# Patient Record
Sex: MALE | Race: WHITE | NOT HISPANIC OR LATINO | Employment: FULL TIME | URBAN - METROPOLITAN AREA
[De-identification: names, ages, dates, MRNs, and addresses within clinical notes are randomized per-mention and may not be internally consistent; named-entity substitution may affect disease eponyms.]

---

## 2017-01-10 ENCOUNTER — APPOINTMENT (OUTPATIENT)
Dept: GENERAL RADIOLOGY | Facility: HOSPITAL | Age: 37
End: 2017-01-10

## 2017-01-10 ENCOUNTER — HOSPITAL ENCOUNTER (OUTPATIENT)
Facility: HOSPITAL | Age: 37
Setting detail: OBSERVATION
Discharge: HOME | End: 2017-01-11
Attending: EMERGENCY MEDICINE | Admitting: SURGERY

## 2017-01-10 ENCOUNTER — ANESTHESIA EVENT (OUTPATIENT)
Dept: PERIOP | Facility: HOSPITAL | Age: 37
End: 2017-01-10

## 2017-01-10 ENCOUNTER — ANESTHESIA (OUTPATIENT)
Dept: PERIOP | Facility: HOSPITAL | Age: 37
End: 2017-01-10

## 2017-01-10 ENCOUNTER — APPOINTMENT (OUTPATIENT)
Dept: CT IMAGING | Facility: HOSPITAL | Age: 37
End: 2017-01-10

## 2017-01-10 DIAGNOSIS — D72.829 LEUKOCYTOSIS, UNSPECIFIED TYPE: ICD-10-CM

## 2017-01-10 DIAGNOSIS — K35.890 OTHER ACUTE APPENDICITIS: Primary | ICD-10-CM

## 2017-01-10 DIAGNOSIS — R11.2 NON-INTRACTABLE VOMITING WITH NAUSEA, UNSPECIFIED VOMITING TYPE: ICD-10-CM

## 2017-01-10 DIAGNOSIS — K35.80 ACUTE APPENDICITIS: ICD-10-CM

## 2017-01-10 DIAGNOSIS — K35.30 ACUTE APPENDICITIS WITH LOCALIZED PERITONITIS: ICD-10-CM

## 2017-01-10 LAB
ALBUMIN SERPL-MCNC: 5.1 G/DL (ref 3.2–4.8)
ALBUMIN/GLOB SERPL: 1.3 G/DL (ref 1.5–2.5)
ALP SERPL-CCNC: 105 U/L (ref 25–100)
ALT SERPL W P-5'-P-CCNC: 35 U/L (ref 7–40)
ANION GAP SERPL CALCULATED.3IONS-SCNC: 14 MMOL/L (ref 3–11)
AST SERPL-CCNC: 53 U/L (ref 0–33)
BACTERIA UR QL AUTO: ABNORMAL /HPF
BASOPHILS # BLD AUTO: 0.02 10*3/MM3 (ref 0–0.2)
BASOPHILS NFR BLD AUTO: 0.1 % (ref 0–1)
BILIRUB SERPL-MCNC: 1.1 MG/DL (ref 0.3–1.2)
BILIRUB UR QL STRIP: NEGATIVE
BUN BLD-MCNC: 17 MG/DL (ref 9–23)
BUN/CREAT SERPL: 21.3 (ref 7–25)
CALCIUM SPEC-SCNC: 10.3 MG/DL (ref 8.7–10.4)
CHLORIDE SERPL-SCNC: 100 MMOL/L (ref 99–109)
CLARITY UR: CLEAR
CO2 SERPL-SCNC: 27 MMOL/L (ref 20–31)
COLOR UR: YELLOW
CREAT BLD-MCNC: 0.8 MG/DL (ref 0.6–1.3)
DEPRECATED RDW RBC AUTO: 38.9 FL (ref 37–54)
EOSINOPHIL # BLD AUTO: 0.16 10*3/MM3 (ref 0.1–0.3)
EOSINOPHIL NFR BLD AUTO: 0.7 % (ref 0–3)
ERYTHROCYTE [DISTWIDTH] IN BLOOD BY AUTOMATED COUNT: 12.7 % (ref 11.3–14.5)
FLUAV AG NPH QL: NEGATIVE
FLUBV AG NPH QL IA: NEGATIVE
GFR SERPL CREATININE-BSD FRML MDRD: 108 ML/MIN/1.73
GLOBULIN UR ELPH-MCNC: 3.8 GM/DL
GLUCOSE BLD-MCNC: 108 MG/DL (ref 70–100)
GLUCOSE UR STRIP-MCNC: NEGATIVE MG/DL
HCT VFR BLD AUTO: 48.6 % (ref 38.9–50.9)
HGB BLD-MCNC: 17.7 G/DL (ref 13.1–17.5)
HGB UR QL STRIP.AUTO: ABNORMAL
HOLD SPECIMEN: NORMAL
HYALINE CASTS UR QL AUTO: ABNORMAL /LPF
IMM GRANULOCYTES # BLD: 0.07 10*3/MM3 (ref 0–0.03)
IMM GRANULOCYTES NFR BLD: 0.3 % (ref 0–0.6)
KETONES UR QL STRIP: NEGATIVE
LEUKOCYTE ESTERASE UR QL STRIP.AUTO: NEGATIVE
LIPASE SERPL-CCNC: 37 U/L (ref 6–51)
LYMPHOCYTES # BLD AUTO: 0.84 10*3/MM3 (ref 0.6–4.8)
LYMPHOCYTES NFR BLD AUTO: 3.6 % (ref 24–44)
MCH RBC QN AUTO: 30.9 PG (ref 27–31)
MCHC RBC AUTO-ENTMCNC: 36.4 G/DL (ref 32–36)
MCV RBC AUTO: 85 FL (ref 80–99)
MONOCYTES # BLD AUTO: 0.5 10*3/MM3 (ref 0–1)
MONOCYTES NFR BLD AUTO: 2.2 % (ref 0–12)
NEUTROPHILS # BLD AUTO: 21.62 10*3/MM3 (ref 1.5–8.3)
NEUTROPHILS NFR BLD AUTO: 93.1 % (ref 41–71)
NITRITE UR QL STRIP: NEGATIVE
PH UR STRIP.AUTO: 5.5 [PH] (ref 5–8)
PLATELET # BLD AUTO: 297 10*3/MM3 (ref 150–450)
PMV BLD AUTO: 9.4 FL (ref 6–12)
POTASSIUM BLD-SCNC: 4.7 MMOL/L (ref 3.5–5.5)
PROT SERPL-MCNC: 8.9 G/DL (ref 5.7–8.2)
PROT UR QL STRIP: NEGATIVE
RBC # BLD AUTO: 5.72 10*6/MM3 (ref 4.2–5.76)
RBC # UR: ABNORMAL /HPF
REF LAB TEST METHOD: ABNORMAL
SODIUM BLD-SCNC: 141 MMOL/L (ref 132–146)
SP GR UR STRIP: 1.02 (ref 1–1.03)
SQUAMOUS #/AREA URNS HPF: ABNORMAL /HPF
UROBILINOGEN UR QL STRIP: ABNORMAL
WBC NRBC COR # BLD: 23.21 10*3/MM3 (ref 3.5–10.8)
WBC UR QL AUTO: ABNORMAL /HPF
WHOLE BLOOD HOLD SPECIMEN: NORMAL
WHOLE BLOOD HOLD SPECIMEN: NORMAL

## 2017-01-10 PROCEDURE — 96375 TX/PRO/DX INJ NEW DRUG ADDON: CPT

## 2017-01-10 PROCEDURE — 96376 TX/PRO/DX INJ SAME DRUG ADON: CPT

## 2017-01-10 PROCEDURE — 96361 HYDRATE IV INFUSION ADD-ON: CPT

## 2017-01-10 PROCEDURE — 80053 COMPREHEN METABOLIC PANEL: CPT | Performed by: EMERGENCY MEDICINE

## 2017-01-10 PROCEDURE — 87804 INFLUENZA ASSAY W/OPTIC: CPT | Performed by: PHYSICIAN ASSISTANT

## 2017-01-10 PROCEDURE — 25010000002 DEXAMETHASONE PER 1 MG: Performed by: ANESTHESIOLOGY

## 2017-01-10 PROCEDURE — 25010000002 PROPOFOL 10 MG/ML EMULSION: Performed by: ANESTHESIOLOGY

## 2017-01-10 PROCEDURE — 25010000002 ONDANSETRON PER 1 MG

## 2017-01-10 PROCEDURE — 74176 CT ABD & PELVIS W/O CONTRAST: CPT

## 2017-01-10 PROCEDURE — 25010000002 FENTANYL CITRATE (PF) 100 MCG/2ML SOLUTION: Performed by: ANESTHESIOLOGY

## 2017-01-10 PROCEDURE — 25010000002 ONDANSETRON PER 1 MG: Performed by: ANESTHESIOLOGY

## 2017-01-10 PROCEDURE — 93005 ELECTROCARDIOGRAM TRACING: CPT

## 2017-01-10 PROCEDURE — 25010000002 SUCCINYLCHOLINE PER 20 MG: Performed by: ANESTHESIOLOGY

## 2017-01-10 PROCEDURE — 25010000002 MIDAZOLAM PER 1 MG: Performed by: ANESTHESIOLOGY

## 2017-01-10 PROCEDURE — 99285 EMERGENCY DEPT VISIT HI MDM: CPT

## 2017-01-10 PROCEDURE — 25010000002 TIGECYCLINE PER 1 MG: Performed by: SURGERY

## 2017-01-10 PROCEDURE — 25010000002 KETOROLAC TROMETHAMINE PER 15 MG: Performed by: PHYSICIAN ASSISTANT

## 2017-01-10 PROCEDURE — 25010000002 ONDANSETRON PER 1 MG: Performed by: PHYSICIAN ASSISTANT

## 2017-01-10 PROCEDURE — 71020 HC CHEST PA AND LATERAL: CPT

## 2017-01-10 PROCEDURE — 96374 THER/PROPH/DIAG INJ IV PUSH: CPT

## 2017-01-10 PROCEDURE — 25010000002 NEOSTIGMINE PER 0.5 MG: Performed by: ANESTHESIOLOGY

## 2017-01-10 PROCEDURE — 88304 TISSUE EXAM BY PATHOLOGIST: CPT | Performed by: SURGERY

## 2017-01-10 PROCEDURE — 81001 URINALYSIS AUTO W/SCOPE: CPT | Performed by: EMERGENCY MEDICINE

## 2017-01-10 PROCEDURE — 83690 ASSAY OF LIPASE: CPT | Performed by: EMERGENCY MEDICINE

## 2017-01-10 PROCEDURE — 85025 COMPLETE CBC W/AUTO DIFF WBC: CPT | Performed by: EMERGENCY MEDICINE

## 2017-01-10 RX ORDER — FENTANYL CITRATE 50 UG/ML
INJECTION, SOLUTION INTRAMUSCULAR; INTRAVENOUS AS NEEDED
Status: DISCONTINUED | OUTPATIENT
Start: 2017-01-10 | End: 2017-01-10 | Stop reason: SURG

## 2017-01-10 RX ORDER — PROMETHAZINE HYDROCHLORIDE 25 MG/1
25 TABLET ORAL ONCE AS NEEDED
Status: DISCONTINUED | OUTPATIENT
Start: 2017-01-10 | End: 2017-01-11 | Stop reason: HOSPADM

## 2017-01-10 RX ORDER — PROMETHAZINE HYDROCHLORIDE 25 MG/ML
6.25 INJECTION, SOLUTION INTRAMUSCULAR; INTRAVENOUS ONCE AS NEEDED
Status: DISCONTINUED | OUTPATIENT
Start: 2017-01-10 | End: 2017-01-11 | Stop reason: HOSPADM

## 2017-01-10 RX ORDER — SODIUM CHLORIDE 9 MG/ML
INJECTION, SOLUTION INTRAVENOUS AS NEEDED
Status: DISCONTINUED | OUTPATIENT
Start: 2017-01-10 | End: 2017-01-11 | Stop reason: HOSPADM

## 2017-01-10 RX ORDER — PROPOFOL 10 MG/ML
VIAL (ML) INTRAVENOUS AS NEEDED
Status: DISCONTINUED | OUTPATIENT
Start: 2017-01-10 | End: 2017-01-10 | Stop reason: SURG

## 2017-01-10 RX ORDER — ONDANSETRON 2 MG/ML
4 INJECTION INTRAMUSCULAR; INTRAVENOUS ONCE
Status: COMPLETED | OUTPATIENT
Start: 2017-01-10 | End: 2017-01-10

## 2017-01-10 RX ORDER — PROMETHAZINE HYDROCHLORIDE 25 MG/1
25 SUPPOSITORY RECTAL ONCE AS NEEDED
Status: DISCONTINUED | OUTPATIENT
Start: 2017-01-10 | End: 2017-01-11 | Stop reason: HOSPADM

## 2017-01-10 RX ORDER — FAMOTIDINE 10 MG/ML
20 INJECTION, SOLUTION INTRAVENOUS ONCE
Status: CANCELLED | OUTPATIENT
Start: 2017-01-10 | End: 2017-01-10

## 2017-01-10 RX ORDER — SODIUM CHLORIDE, SODIUM LACTATE, POTASSIUM CHLORIDE, CALCIUM CHLORIDE 600; 310; 30; 20 MG/100ML; MG/100ML; MG/100ML; MG/100ML
INJECTION, SOLUTION INTRAVENOUS CONTINUOUS PRN
Status: DISCONTINUED | OUTPATIENT
Start: 2017-01-10 | End: 2017-01-10 | Stop reason: SURG

## 2017-01-10 RX ORDER — SODIUM CHLORIDE, SODIUM LACTATE, POTASSIUM CHLORIDE, CALCIUM CHLORIDE 600; 310; 30; 20 MG/100ML; MG/100ML; MG/100ML; MG/100ML
9 INJECTION, SOLUTION INTRAVENOUS CONTINUOUS
Status: CANCELLED | OUTPATIENT
Start: 2017-01-10

## 2017-01-10 RX ORDER — FAMOTIDINE 20 MG/1
20 TABLET, FILM COATED ORAL ONCE
Status: CANCELLED | OUTPATIENT
Start: 2017-01-10 | End: 2017-01-10

## 2017-01-10 RX ORDER — GLYCOPYRROLATE 0.2 MG/ML
INJECTION INTRAMUSCULAR; INTRAVENOUS AS NEEDED
Status: DISCONTINUED | OUTPATIENT
Start: 2017-01-10 | End: 2017-01-10 | Stop reason: SURG

## 2017-01-10 RX ORDER — LIDOCAINE HYDROCHLORIDE 10 MG/ML
1 INJECTION, SOLUTION EPIDURAL; INFILTRATION; INTRACAUDAL; PERINEURAL ONCE
Status: CANCELLED | OUTPATIENT
Start: 2017-01-10 | End: 2017-01-10

## 2017-01-10 RX ORDER — ATRACURIUM BESYLATE 10 MG/ML
INJECTION, SOLUTION INTRAVENOUS AS NEEDED
Status: DISCONTINUED | OUTPATIENT
Start: 2017-01-10 | End: 2017-01-10 | Stop reason: SURG

## 2017-01-10 RX ORDER — BUPIVACAINE HYDROCHLORIDE AND EPINEPHRINE 2.5; 5 MG/ML; UG/ML
INJECTION, SOLUTION EPIDURAL; INFILTRATION; INTRACAUDAL; PERINEURAL AS NEEDED
Status: DISCONTINUED | OUTPATIENT
Start: 2017-01-10 | End: 2017-01-11 | Stop reason: HOSPADM

## 2017-01-10 RX ORDER — ESMOLOL HYDROCHLORIDE 10 MG/ML
INJECTION INTRAVENOUS AS NEEDED
Status: DISCONTINUED | OUTPATIENT
Start: 2017-01-10 | End: 2017-01-10 | Stop reason: SURG

## 2017-01-10 RX ORDER — FENTANYL CITRATE 50 UG/ML
50 INJECTION, SOLUTION INTRAMUSCULAR; INTRAVENOUS
Status: DISCONTINUED | OUTPATIENT
Start: 2017-01-10 | End: 2017-01-11 | Stop reason: HOSPADM

## 2017-01-10 RX ORDER — ONDANSETRON 2 MG/ML
INJECTION INTRAMUSCULAR; INTRAVENOUS AS NEEDED
Status: DISCONTINUED | OUTPATIENT
Start: 2017-01-10 | End: 2017-01-10 | Stop reason: SURG

## 2017-01-10 RX ORDER — KETOROLAC TROMETHAMINE 30 MG/ML
30 INJECTION, SOLUTION INTRAMUSCULAR; INTRAVENOUS ONCE
Status: COMPLETED | OUTPATIENT
Start: 2017-01-10 | End: 2017-01-10

## 2017-01-10 RX ORDER — LIDOCAINE HYDROCHLORIDE 10 MG/ML
INJECTION, SOLUTION INFILTRATION; PERINEURAL AS NEEDED
Status: DISCONTINUED | OUTPATIENT
Start: 2017-01-10 | End: 2017-01-10 | Stop reason: SURG

## 2017-01-10 RX ORDER — SODIUM CHLORIDE 0.9 % (FLUSH) 0.9 %
1-10 SYRINGE (ML) INJECTION AS NEEDED
Status: CANCELLED | OUTPATIENT
Start: 2017-01-10

## 2017-01-10 RX ORDER — HYDROMORPHONE HYDROCHLORIDE 1 MG/ML
0.5 INJECTION, SOLUTION INTRAMUSCULAR; INTRAVENOUS; SUBCUTANEOUS
Status: DISCONTINUED | OUTPATIENT
Start: 2017-01-10 | End: 2017-01-11 | Stop reason: HOSPADM

## 2017-01-10 RX ORDER — DEXAMETHASONE SODIUM PHOSPHATE 4 MG/ML
INJECTION, SOLUTION INTRA-ARTICULAR; INTRALESIONAL; INTRAMUSCULAR; INTRAVENOUS; SOFT TISSUE AS NEEDED
Status: DISCONTINUED | OUTPATIENT
Start: 2017-01-10 | End: 2017-01-10 | Stop reason: SURG

## 2017-01-10 RX ORDER — SODIUM CHLORIDE 0.9 % (FLUSH) 0.9 %
10 SYRINGE (ML) INJECTION AS NEEDED
Status: DISCONTINUED | OUTPATIENT
Start: 2017-01-10 | End: 2017-01-11 | Stop reason: HOSPADM

## 2017-01-10 RX ORDER — SUCCINYLCHOLINE CHLORIDE 20 MG/ML
INJECTION INTRAMUSCULAR; INTRAVENOUS AS NEEDED
Status: DISCONTINUED | OUTPATIENT
Start: 2017-01-10 | End: 2017-01-10 | Stop reason: SURG

## 2017-01-10 RX ORDER — MIDAZOLAM HYDROCHLORIDE 1 MG/ML
INJECTION INTRAMUSCULAR; INTRAVENOUS AS NEEDED
Status: DISCONTINUED | OUTPATIENT
Start: 2017-01-10 | End: 2017-01-10 | Stop reason: SURG

## 2017-01-10 RX ADMIN — MIDAZOLAM HYDROCHLORIDE 2 MG: 1 INJECTION, SOLUTION INTRAMUSCULAR; INTRAVENOUS at 22:52

## 2017-01-10 RX ADMIN — ONDANSETRON 4 MG: 2 INJECTION INTRAMUSCULAR; INTRAVENOUS at 19:15

## 2017-01-10 RX ADMIN — ONDANSETRON 4 MG: 2 INJECTION INTRAMUSCULAR; INTRAVENOUS at 16:58

## 2017-01-10 RX ADMIN — SODIUM CHLORIDE 1000 ML: 9 INJECTION, SOLUTION INTRAVENOUS at 16:57

## 2017-01-10 RX ADMIN — ESMOLOL HYDROCHLORIDE 25 MG: 10 INJECTION, SOLUTION INTRAVENOUS at 23:25

## 2017-01-10 RX ADMIN — SODIUM CHLORIDE 100 MG: 9 INJECTION, SOLUTION INTRAVENOUS at 22:53

## 2017-01-10 RX ADMIN — SODIUM CHLORIDE 1000 ML: 9 INJECTION, SOLUTION INTRAVENOUS at 18:59

## 2017-01-10 RX ADMIN — SUCCINYLCHOLINE CHLORIDE 160 MG: 20 INJECTION, SOLUTION INTRAMUSCULAR; INTRAVENOUS at 22:58

## 2017-01-10 RX ADMIN — FENTANYL CITRATE 100 MCG: 50 INJECTION, SOLUTION INTRAMUSCULAR; INTRAVENOUS at 22:58

## 2017-01-10 RX ADMIN — ROBINUL 0.3 MG: 0.2 INJECTION INTRAMUSCULAR; INTRAVENOUS at 23:33

## 2017-01-10 RX ADMIN — ESMOLOL HYDROCHLORIDE 20 MG: 10 INJECTION, SOLUTION INTRAVENOUS at 23:31

## 2017-01-10 RX ADMIN — LIDOCAINE HYDROCHLORIDE 50 MG: 10 INJECTION, SOLUTION INFILTRATION; PERINEURAL at 22:58

## 2017-01-10 RX ADMIN — ONDANSETRON 4 MG: 2 INJECTION INTRAMUSCULAR; INTRAVENOUS at 23:31

## 2017-01-10 RX ADMIN — PROPOFOL 200 MG: 10 INJECTION, EMULSION INTRAVENOUS at 22:58

## 2017-01-10 RX ADMIN — ATRACURIUM BESYLATE 20 MG: 10 INJECTION, SOLUTION INTRAVENOUS at 23:06

## 2017-01-10 RX ADMIN — ATRACURIUM BESYLATE 10 MG: 10 INJECTION, SOLUTION INTRAVENOUS at 22:58

## 2017-01-10 RX ADMIN — SODIUM CHLORIDE, POTASSIUM CHLORIDE, SODIUM LACTATE AND CALCIUM CHLORIDE: 600; 310; 30; 20 INJECTION, SOLUTION INTRAVENOUS at 22:53

## 2017-01-10 RX ADMIN — Medication 10 ML: at 21:06

## 2017-01-10 RX ADMIN — SODIUM CHLORIDE 1000 ML: 9 INJECTION, SOLUTION INTRAVENOUS at 17:40

## 2017-01-10 RX ADMIN — IBUPROFEN 600 MG: 100 SUSPENSION ORAL at 19:05

## 2017-01-10 RX ADMIN — KETOROLAC TROMETHAMINE 30 MG: 30 INJECTION, SOLUTION INTRAMUSCULAR at 21:05

## 2017-01-10 RX ADMIN — Medication 2.5 MG: at 23:33

## 2017-01-10 RX ADMIN — DEXAMETHASONE SODIUM PHOSPHATE 8 MG: 4 INJECTION, SOLUTION INTRAMUSCULAR; INTRAVENOUS at 23:04

## 2017-01-10 NOTE — IP AVS SNAPSHOT
AFTER VISIT SUMMARY             Lavelle Cabral           About your hospitalization     You were admitted on:  January 10, 2017 You last received care in the:  [address] [facility]       Procedures & Surgeries      Procedure(s) (LRB):  APPENDECTOMY LAPAROSCOPIC (N/A)     1/10/2017 - 1/11/2017     Surgeon(s):  Nicholas Gifford MD  -------------------      Medications    If you or your caregiver advised us that you are currently taking a medication and that medication is marked below as “Resume”, this simply indicates that we have reviewed those medications to make sure our new therapy recommendations do not interfere.  If you have concerns about medications other than those new ones which we are prescribing today, please consult the physician who prescribed them (or your primary physician).  Our review of your home medications is not meant to indicate that we are directing their use.             Your Medications      START taking these medications      MG capsule   Take 100 mg by mouth 2 (Two) Times a Day.   Last time this was given:  1/11/2017  9:30 AM           levoFLOXacin 500 MG tablet   Take 1 tablet by mouth Daily.   Commonly known as:  LEVAQUIN           metroNIDAZOLE 500 MG tablet   Take 1 tablet by mouth 3 (Three) Times a Day.   Commonly known as:  FLAGYL           oxyCODONE-acetaminophen 5-325 MG per tablet   Take 2 tablets by mouth Every 4 (Four) Hours As Needed for moderate pain (4-6) for up to 10 days.   Last time this was given:  1/11/2017  1:30 PM   Commonly known as:  PERCOCET             CHANGE how you take these medications     ibuprofen 600 MG tablet   Take 1 tablet by mouth Every 6 (Six) Hours As Needed for moderate pain (4-6) for up to 15 doses.   Commonly known as:  ADVIL,MOTRIN   What changed:    - how much to take  - Another medication with the same name was removed. Continue taking this medication, and follow the directions you see here.             CONTINUE taking  these medications     aspirin 81 MG chewable tablet   Take 81 mg by mouth daily           CloNIDine 0.1 MG tablet   Take 0.1 mg by mouth Every Night.   Commonly known as:  CATAPRES           cyclobenzaprine 10 MG tablet   Take 1 tablet by mouth 3 (three) times a day as needed for muscle spasms.   Commonly known as:  FLEXERIL           cyclobenzaprine 10 MG tablet   Take 1 tablet by mouth 3 (Three) Times a Day As Needed for muscle spasms.   Commonly known as:  FLEXERIL           diclofenac 50 MG EC tablet   Take 1 tablet by mouth 3 (Three) Times a Day.   Commonly known as:  VOLTAREN           DULoxetine 20 MG capsule   Take 60 mg by mouth Daily.   Last time this was given:  1/11/2017  9:28 AM   Commonly known as:  CYMBALTA           gabapentin 600 MG tablet   3 (Three) Times a Day.   Commonly known as:  NEURONTIN           LORazepam 1 MG tablet   Take 1 tablet by mouth every 8 (eight) hours as needed for anxiety.   Commonly known as:  ATIVAN           methocarbamol 750 MG tablet   Take 750 mg by mouth 2 (two) times a day.   Last time this was given:  1/11/2017  9:28 AM   Commonly known as:  ROBAXIN           ondansetron 4 MG tablet   Take 4 mg by mouth every 8 (eight) hours as needed for nausea or vomiting.   Last time this was given:  1/11/2017  1:21 AM   Commonly known as:  ZOFRAN           ondansetron ODT 4 MG disintegrating tablet   Take 1 tablet by mouth every 6 (six) hours as needed for nausea or vomiting for up to 15 doses.   Commonly known as:  ZOFRAN-ODT           sertraline 100 MG tablet   Take 100 mg by mouth Daily.   Last time this was given:  1/11/2017  9:28 AM   Commonly known as:  ZOLOFT           TYLENOL/CODEINE #3 300-30 MG per tablet   Take 1 tablet by mouth every 4 (four) hours as needed for moderate pain (4-6).   Generic drug:  acetaminophen-codeine           valsartan-hydrochlorothiazide 160-25 MG per tablet   Take 1 tablet by mouth 2 (two) times a day.   Commonly known as:  DIOVAN-HCT              STOP taking these medications     clindamycin 300 MG capsule   Commonly known as:  CLEOCIN           HYDROcodone-acetaminophen  MG per tablet   Commonly known as:  NORCO           HYDROcodone-acetaminophen 5-325 MG per tablet   Commonly known as:  NORCO           MethylPREDNISolone 4 MG tablet   Commonly known as:  MEDROL (BAM)                Where to Get Your Medications      These medications were sent to [facility] - [address] - [phone]  - [phone] [place] [address] [facility] [place] [address]     Phone:  [phone]      MG capsule    levoFLOXacin 500 MG tablet    metroNIDAZOLE 500 MG tablet         You can get these medications from any pharmacy     Bring a paper prescription for each of these medications     oxyCODONE-acetaminophen 5-325 MG per tablet                  Your Medications      Your Medication List           Morning Noon Evening Bedtime As Needed    aspirin 81 MG chewable tablet   Take 81 mg by mouth daily                                   CloNIDine 0.1 MG tablet   Take 0.1 mg by mouth Every Night.   Commonly known as:  CATAPRES                                   * cyclobenzaprine 10 MG tablet   Take 1 tablet by mouth 3 (three) times a day as needed for muscle spasms.   Commonly known as:  FLEXERIL                                   * cyclobenzaprine 10 MG tablet   Take 1 tablet by mouth 3 (Three) Times a Day As Needed for muscle spasms.   Commonly known as:  FLEXERIL                                   diclofenac 50 MG EC tablet   Take 1 tablet by mouth 3 (Three) Times a Day.   Commonly known as:  VOLTAREN                                          MG capsule   Take 100 mg by mouth 2 (Two) Times a Day.                                      DULoxetine 20 MG capsule   Take 60 mg by mouth Daily.   Commonly known as:  CYMBALTA                                   gabapentin 600 MG tablet   3 (Three) Times a  Day.   Commonly known as:  NEURONTIN                                         ibuprofen 600 MG tablet   Take 1 tablet by mouth Every 6 (Six) Hours As Needed for moderate pain (4-6) for up to 15 doses.   Commonly known as:  ADVIL,MOTRIN                                   levoFLOXacin 500 MG tablet   Take 1 tablet by mouth Daily.   Commonly known as:  LEVAQUIN                                   LORazepam 1 MG tablet   Take 1 tablet by mouth every 8 (eight) hours as needed for anxiety.   Commonly known as:  ATIVAN                                   methocarbamol 750 MG tablet   Take 750 mg by mouth 2 (two) times a day.   Commonly known as:  ROBAXIN                                      metroNIDAZOLE 500 MG tablet   Take 1 tablet by mouth 3 (Three) Times a Day.   Commonly known as:  FLAGYL                                         ondansetron 4 MG tablet   Take 4 mg by mouth every 8 (eight) hours as needed for nausea or vomiting.   Commonly known as:  ZOFRAN                                   ondansetron ODT 4 MG disintegrating tablet   Take 1 tablet by mouth every 6 (six) hours as needed for nausea or vomiting for up to 15 doses.   Commonly known as:  ZOFRAN-ODT                                   oxyCODONE-acetaminophen 5-325 MG per tablet   Take 2 tablets by mouth Every 4 (Four) Hours As Needed for moderate pain (4-6) for up to 10 days.   Commonly known as:  PERCOCET                                   sertraline 100 MG tablet   Take 100 mg by mouth Daily.   Commonly known as:  ZOLOFT                                TYLENOL/CODEINE #3 300-30 MG per tablet   Take 1 tablet by mouth every 4 (four) hours as needed for moderate pain (4-6).   Generic drug:  acetaminophen-codeine                                valsartan-hydrochlorothiazide 160-25 MG per tablet   Take 1 tablet by mouth 2 (two) times a day.   Commonly known as:  DIOVAN-HCT                                      * Notice:  This list has 2 medication(s) that are the same as  other medications prescribed for you. Read the directions carefully, and ask your doctor or other care provider to review them with you.             Instructions for After Discharge        Activity Instructions     Discharge Activity Restrictions       1) No driving for 2 weeks and no longer taking narcotics.   2) Return to school / work in 2-4 weeks  3) May shower in 24 hours  4) Do not lift / push / pull more then 30 lbs. Until RTC             Diet Instructions     regular           Other Instructions     Remove Dressing in 24 Hours                 Discharge References/Attachments     APPENDICITIS (ENGLISH)       Follow-ups for After Discharge        Follow-up Information     Follow up with Cortney Gill MD .    Specialty:  Family Medicine    Contact information:    [address]  [address]  [place] [address]  [phone]          Follow up with Nicholas Gifford MD Follow up in 4 week(s).    Specialty:  General Surgery    Why:  Follow Up: Thursday Febuary 9th at 2:30     Contact information:    [address]  [address]  [place] [address]  [phone]        Referrals and Follow-ups to Schedule     Call MD With Problems / Concerns    As directed    If Temp > 102, worsening pain, concerning changes to incision including redness, swelling, drainage. Call if problems with bowel function that don't respond to medications.       Follow-Up    As directed    Dr. Gifford   Follow Up Details:  4 weeks             [employer] Signup     Our records indicate that your [employer] account has been deactivated. If you would like to reactivate your account, please email [email or web address] or call [phone] to talk to our [employer] staff.         Summary of Your Hospitalization        Reason for Hospitalization     Your primary diagnosis was:  Not on File    Your diagnoses also included:  Acute Inflammation Of Appendix      Care Providers     Provider Service Role Specialty    Nicholas Gifford,  MD Surgery Attending Provider General Surgery    Nicholas Gifford MD Surgery Surgeon  General Surgery      Your Allergies  Date Reviewed: 1/10/2017    Allergen Reactions    Mustard (Allyl Isothiocyanate) Not Noted         Ultram (Tramadol Hcl) Not Noted         Amoxicillin Rash      Pending Labs     Order Current Status    Tissue Exam In process      Patient Belongings Returned     Document Return of Belongings Flowsheet     Were the patient bedside belongings sent home?   Yes   Belongings Retrieved from Security & Sent Home   N/A    Belongings Sent to Safe   --   Medications Retrieved from Pharmacy & Sent Home   N/A              More Information      Appendicitis  Appendicitis is when the appendix is swollen (inflamed). The inflammation can lead to developing a hole (perforation) and a collection of pus (abscess).  CAUSES   There is not always an obvious cause of appendicitis. Sometimes it is caused by an obstruction in the appendix. The obstruction can be caused by:  · A small, hard, pea-sized ball of stool (fecalith).  · Enlarged lymph glands in the appendix.  SYMPTOMS   · Pain around your belly button (navel) that moves toward your lower right belly (abdomen). The pain can become more severe and sharp as time passes.  · Tenderness in the lower right abdomen. Pain gets worse if you cough or make a sudden movement.  · Feeling sick to your stomach (nauseous).  · Throwing up (vomiting).  · Loss of appetite.  · Fever.  · Constipation.  · Diarrhea.  · Generally not feeling well.  DIAGNOSIS   · Physical exam.  · Blood tests.  · Urine test.  · X-rays or a CT scan may confirm the diagnosis.  TREATMENT   Once the diagnosis of appendicitis is made, the most common treatment is to remove the appendix as soon as possible. This procedure is called appendectomy. In an open appendectomy, a cut (incision) is made in the lower right abdomen and the appendix is removed. In a laparoscopic appendectomy, usually 3 small incisions  "are made. Long, thin instruments and a camera tube are used to remove the appendix. Most patients go home in 24 to 48 hours after appendectomy.  In some situations, the appendix may have already perforated and an abscess may have formed. The abscess may have a \"wall\" around it as seen on a CT scan. In this case, a drain may be placed into the abscess to remove fluid, and you may be treated with antibiotic medicines that kill germs. The medicine is given through a tube in your vein (IV). Once the abscess has resolved, it may or may not be necessary to have an appendectomy. You may need to stay in the hospital longer than 48 hours.     This information is not intended to replace advice given to you by your health care provider. Make sure you discuss any questions you have with your health care provider.     Document Released: 12/18/2006 Document Revised: 06/18/2013 Document Reviewed: 05/04/2016  [employer] Interactive Patient Education ©2016 [employer] Inc.         PREVENTING SURGICAL SITE INFECTIONS     Surgical Site Infections FAQs  What is a Surgical Site Infection (SSI)?  A surgical site infection is an infection that occurs after surgery in the part of the body where the surgery took place. Most patients who have surgery do not develop an infection. However, infections develop in about 1 to 3 out of every 100 patients who have surgery.  Some of the common symptoms of a surgical site infection are:  · Redness and pain around the area where you had surgery  · Drainage of cloudy fluid from your surgical wound  · Fever  Can SSIs be treated?  Yes. Most surgical site infections can be treated with antibiotics. The antibiotic given to you depends on the bacteria (germs) causing the infection. Sometimes patients with SSIs also need another surgery to treat the infection.  What are some of the things that hospitals are doing to prevent SSIs?  To prevent SSIs, doctors, nurses, and other healthcare providers:  · Clean their " hands and arms up to their elbows with an antiseptic agent just before the surgery.  · Clean their hands with soap and water or an alcohol-based hand rub before and after caring for each patient.  · May remove some of your hair immediately before your surgery using electric clippers if the hair is in the same area where the procedure will occur. They should not shave you with a razor.  · Wear special hair covers, masks, gowns, and gloves during surgery to keep the surgery area clean.  · Give you antibiotics before your surgery starts. In most cases, you should get antibiotics within 60 minutes before the surgery starts and the antibiotics should be stopped within 24 hours after surgery.  · Clean the skin at the site of your surgery with a special soap that kills germs.  What can I do to help prevent SSIs?  Before your surgery:  · Tell your doctor about other medical problems you may have. Health problems such as allergies, diabetes, and obesity could affect your surgery and your treatment.  · Quit smoking. Patients who smoke get more infections. Talk to your doctor about how you can quit before your surgery.  · Do not shave near where you will have surgery. Shaving with a razor can irritate your skin and make it easier to develop an infection.  At the time of your surgery:  · Speak up if someone tries to shave you with a razor before surgery. Ask why you need to be shaved and talk with your surgeon if you have any concerns.  · Ask if you will get antibiotics before surgery.  After your surgery:  · Make sure that your healthcare providers clean their hands before examining you, either with soap and water or an alcohol-based hand rub.    If you do not see your providers clean their hands, please ask them to do so.  · Family and friends who visit you should not touch the surgical wound or dressings.  · Family and friends should clean their hands with soap and water or an alcohol-based hand rub before and after visiting  you. If you do not see them clean their hands, ask them to clean their hands.  What do I need to do when I go home from the hospital?  · Before you go home, your doctor or nurse should explain everything you need to know about taking care of your wound. Make sure you understand how to care for your wound before you leave the hospital.  · Always clean your hands before and after caring for your wound.  · Before you go home, make sure you know who to contact if you have questions or problems after you get home.  · If you have any symptoms of an infection, such as redness and pain at the surgery site, drainage, or fever, call your doctor immediately.  If you have additional questions, please ask your doctor or nurse.  Developed and co-sponsored by The Society for Healthcare Epidemiology of [place] (SHEA); Infectious Diseases Society of [place] (IDSA); American Hospital Association; Association for Professionals in Infection Control and Epidemiology (APIC); Centers for Disease Control and Prevention (CDC); and The Joint Commission.     This information is not intended to replace advice given to you by your health care provider. Make sure you discuss any questions you have with your health care provider.     Document Released: 12/23/2014 Document Revised: 01/08/2016 Document Reviewed: 03/02/2016  [employer] Interactive Patient Education ©2016 [employer]             SYMPTOMS OF A STROKE    Call 911 or have someone take you to the Emergency Department if you have any of the following:    · Sudden numbness or weakness of your face, arm or leg especially on one side of the body  · Sudden confusion, diffiiculty speaking or trouble understanding   · Changes in your vision or loss of sight in one eye  · Sudden severe headache with no known cause  · sudden dizziness, trouble walking, loss of balance or coordination    It is important to seek emergency care right away if you have further stroke symptoms. If you get emergency  help quickly, the powerful clot-dissolving medicines can reduce the disabilities caused by a stroke.     For more information:    American Stroke Association  2-531-7-STROKE  www.strokeassociation.org           IF YOU SMOKE OR USE TOBACCO PLEASE READ THE FOLLOWING:    Why is smoking bad for me?  Smoking increases the risk of heart disease, lung disease, vascular disease, stroke, and cancer.     If you smoke, STOP!    If you would like more information on quitting smoking, please visit the [employer] website: [email or web address]   This link will provide additional resources including the QUIT line and the Beat the Pack support groups.     For more information:    American Cancer Society  (791) 237-1704    American Heart Association  1-414.674.3005               YOU ARE THE MOST IMPORTANT FACTOR IN YOUR RECOVERY.     Follow all instructions carefully.     I have reviewed my discharge instructions with my nurse, including the following information, if applicable:     Information about my illness and diagnosis   Follow up appointments (including lab draws)   Wound Care   Equipment Needs   Medications (new and continuing) along with side effects   Preventative information such as vaccines and smoking cessations   Diet   Pain   I know when to contact my Doctor's office or seek emergency care      I want my nurse to describe the side effects of my medications: YES NO   If the answer is no, I understand the side effects of my medications: YES NO   My nurse described the side effects of my medications in a way that I could understand: YES NO   I have taken my personal belongings and my own medications with me at discharge: YES NO            I have received this information and my questions have been answered. I have discussed any concerns I see with this plan with the nurse or physician. I understand these instructions.    Signature of Patient or Responsible Person:  _____________________________________    Date: _________________  Time: __________________    Signature of Healthcare Provider: _______________________________________  Date: _________________  Time: __________________

## 2017-01-11 VITALS
OXYGEN SATURATION: 97 % | DIASTOLIC BLOOD PRESSURE: 56 MMHG | RESPIRATION RATE: 18 BRPM | HEART RATE: 102 BPM | BODY MASS INDEX: 34.53 KG/M2 | TEMPERATURE: 98.3 F | WEIGHT: 220 LBS | SYSTOLIC BLOOD PRESSURE: 116 MMHG | HEIGHT: 67 IN

## 2017-01-11 LAB
ANION GAP SERPL CALCULATED.3IONS-SCNC: 9 MMOL/L (ref 3–11)
BASOPHILS # BLD AUTO: 0.01 10*3/MM3 (ref 0–0.2)
BASOPHILS NFR BLD AUTO: 0.1 % (ref 0–1)
BUN BLD-MCNC: 19 MG/DL (ref 9–23)
BUN/CREAT SERPL: 27.1 (ref 7–25)
CALCIUM SPEC-SCNC: 8.9 MG/DL (ref 8.7–10.4)
CHLORIDE SERPL-SCNC: 104 MMOL/L (ref 99–109)
CO2 SERPL-SCNC: 25 MMOL/L (ref 20–31)
CREAT BLD-MCNC: 0.7 MG/DL (ref 0.6–1.3)
DEPRECATED RDW RBC AUTO: 41.5 FL (ref 37–54)
EOSINOPHIL # BLD AUTO: 0 10*3/MM3 (ref 0.1–0.3)
EOSINOPHIL NFR BLD AUTO: 0 % (ref 0–3)
ERYTHROCYTE [DISTWIDTH] IN BLOOD BY AUTOMATED COUNT: 12.8 % (ref 11.3–14.5)
GFR SERPL CREATININE-BSD FRML MDRD: 126 ML/MIN/1.73
GLUCOSE BLD-MCNC: 127 MG/DL (ref 70–100)
HCT VFR BLD AUTO: 38.6 % (ref 38.9–50.9)
HGB BLD-MCNC: 13 G/DL (ref 13.1–17.5)
IMM GRANULOCYTES # BLD: 0.02 10*3/MM3 (ref 0–0.03)
IMM GRANULOCYTES NFR BLD: 0.1 % (ref 0–0.6)
LYMPHOCYTES # BLD AUTO: 0.53 10*3/MM3 (ref 0.6–4.8)
LYMPHOCYTES NFR BLD AUTO: 3.5 % (ref 24–44)
MCH RBC QN AUTO: 29.5 PG (ref 27–31)
MCHC RBC AUTO-ENTMCNC: 33.7 G/DL (ref 32–36)
MCV RBC AUTO: 87.7 FL (ref 80–99)
MONOCYTES # BLD AUTO: 0.09 10*3/MM3 (ref 0–1)
MONOCYTES NFR BLD AUTO: 0.6 % (ref 0–12)
NEUTROPHILS # BLD AUTO: 14.51 10*3/MM3 (ref 1.5–8.3)
NEUTROPHILS NFR BLD AUTO: 95.7 % (ref 41–71)
PLATELET # BLD AUTO: 239 10*3/MM3 (ref 150–450)
PMV BLD AUTO: 9.4 FL (ref 6–12)
POTASSIUM BLD-SCNC: 3.6 MMOL/L (ref 3.5–5.5)
RBC # BLD AUTO: 4.4 10*6/MM3 (ref 4.2–5.76)
SODIUM BLD-SCNC: 138 MMOL/L (ref 132–146)
WBC NRBC COR # BLD: 15.16 10*3/MM3 (ref 3.5–10.8)

## 2017-01-11 PROCEDURE — G0378 HOSPITAL OBSERVATION PER HR: HCPCS

## 2017-01-11 PROCEDURE — 25010000002 TIGECYCLINE PER 1 MG: Performed by: SURGERY

## 2017-01-11 PROCEDURE — 85025 COMPLETE CBC W/AUTO DIFF WBC: CPT | Performed by: SURGERY

## 2017-01-11 PROCEDURE — 25010000002 FENTANYL CITRATE (PF) 100 MCG/2ML SOLUTION: Performed by: ANESTHESIOLOGY

## 2017-01-11 PROCEDURE — 80048 BASIC METABOLIC PNL TOTAL CA: CPT | Performed by: SURGERY

## 2017-01-11 PROCEDURE — 25010000002 HYDROMORPHONE PER 4 MG: Performed by: ANESTHESIOLOGY

## 2017-01-11 PROCEDURE — 25010000002 HEPARIN (PORCINE) PER 1000 UNITS: Performed by: SURGERY

## 2017-01-11 PROCEDURE — 94799 UNLISTED PULMONARY SVC/PX: CPT

## 2017-01-11 PROCEDURE — 94799 UNLISTED PULMONARY SVC/PX: CPT | Performed by: NURSE PRACTITIONER

## 2017-01-11 RX ORDER — MORPHINE SULFATE 2 MG/ML
2 INJECTION, SOLUTION INTRAMUSCULAR; INTRAVENOUS
Status: DISCONTINUED | OUTPATIENT
Start: 2017-01-11 | End: 2017-01-11 | Stop reason: HOSPADM

## 2017-01-11 RX ORDER — ONDANSETRON 2 MG/ML
4 INJECTION INTRAMUSCULAR; INTRAVENOUS EVERY 6 HOURS PRN
Status: DISCONTINUED | OUTPATIENT
Start: 2017-01-11 | End: 2017-01-11 | Stop reason: HOSPADM

## 2017-01-11 RX ORDER — SIMETHICONE 80 MG
80 TABLET,CHEWABLE ORAL 4 TIMES DAILY PRN
Status: DISCONTINUED | OUTPATIENT
Start: 2017-01-11 | End: 2017-01-11 | Stop reason: HOSPADM

## 2017-01-11 RX ORDER — SODIUM CHLORIDE, SODIUM LACTATE, POTASSIUM CHLORIDE, CALCIUM CHLORIDE 600; 310; 30; 20 MG/100ML; MG/100ML; MG/100ML; MG/100ML
50 INJECTION, SOLUTION INTRAVENOUS CONTINUOUS
Status: DISCONTINUED | OUTPATIENT
Start: 2017-01-11 | End: 2017-01-11 | Stop reason: HOSPADM

## 2017-01-11 RX ORDER — CYCLOBENZAPRINE HCL 10 MG
10 TABLET ORAL 3 TIMES DAILY PRN
Status: DISCONTINUED | OUTPATIENT
Start: 2017-01-11 | End: 2017-01-11 | Stop reason: HOSPADM

## 2017-01-11 RX ORDER — PROMETHAZINE HYDROCHLORIDE 25 MG/ML
12.5 INJECTION, SOLUTION INTRAMUSCULAR; INTRAVENOUS EVERY 6 HOURS PRN
Status: DISCONTINUED | OUTPATIENT
Start: 2017-01-11 | End: 2017-01-11 | Stop reason: HOSPADM

## 2017-01-11 RX ORDER — DOCUSATE SODIUM 100 MG/1
100 CAPSULE, LIQUID FILLED ORAL 2 TIMES DAILY
Status: DISCONTINUED | OUTPATIENT
Start: 2017-01-11 | End: 2017-01-11 | Stop reason: HOSPADM

## 2017-01-11 RX ORDER — CLONIDINE HYDROCHLORIDE 0.1 MG/1
0.1 TABLET ORAL NIGHTLY
Status: DISCONTINUED | OUTPATIENT
Start: 2017-01-11 | End: 2017-01-11 | Stop reason: HOSPADM

## 2017-01-11 RX ORDER — DULOXETIN HYDROCHLORIDE 60 MG/1
60 CAPSULE, DELAYED RELEASE ORAL DAILY
Status: DISCONTINUED | OUTPATIENT
Start: 2017-01-11 | End: 2017-01-11 | Stop reason: HOSPADM

## 2017-01-11 RX ORDER — SERTRALINE HYDROCHLORIDE 100 MG/1
100 TABLET, FILM COATED ORAL DAILY
Status: DISCONTINUED | OUTPATIENT
Start: 2017-01-11 | End: 2017-01-11 | Stop reason: HOSPADM

## 2017-01-11 RX ORDER — PSEUDOEPHEDRINE HCL 30 MG
100 TABLET ORAL 2 TIMES DAILY
Qty: 20 CAPSULE | Refills: 0 | Status: SHIPPED | OUTPATIENT
Start: 2017-01-11 | End: 2017-04-30

## 2017-01-11 RX ORDER — METHOCARBAMOL 750 MG/1
750 TABLET, FILM COATED ORAL EVERY 12 HOURS SCHEDULED
Status: DISCONTINUED | OUTPATIENT
Start: 2017-01-11 | End: 2017-01-11 | Stop reason: HOSPADM

## 2017-01-11 RX ORDER — METRONIDAZOLE 500 MG/1
500 TABLET ORAL 3 TIMES DAILY
Qty: 15 TABLET | Refills: 0 | Status: SHIPPED | OUTPATIENT
Start: 2017-01-11 | End: 2017-04-30

## 2017-01-11 RX ORDER — LEVOFLOXACIN 500 MG/1
500 TABLET, FILM COATED ORAL DAILY
Qty: 5 TABLET | Refills: 0 | Status: SHIPPED | OUTPATIENT
Start: 2017-01-11 | End: 2017-04-30

## 2017-01-11 RX ORDER — LORAZEPAM 1 MG/1
1 TABLET ORAL EVERY 8 HOURS PRN
Status: DISCONTINUED | OUTPATIENT
Start: 2017-01-11 | End: 2017-01-11 | Stop reason: HOSPADM

## 2017-01-11 RX ORDER — HEPARIN SODIUM 5000 [USP'U]/ML
5000 INJECTION, SOLUTION INTRAVENOUS; SUBCUTANEOUS EVERY 8 HOURS SCHEDULED
Status: DISCONTINUED | OUTPATIENT
Start: 2017-01-11 | End: 2017-01-11 | Stop reason: HOSPADM

## 2017-01-11 RX ORDER — NALOXONE HCL 0.4 MG/ML
0.4 VIAL (ML) INJECTION
Status: DISCONTINUED | OUTPATIENT
Start: 2017-01-11 | End: 2017-01-11 | Stop reason: HOSPADM

## 2017-01-11 RX ORDER — OXYCODONE HYDROCHLORIDE AND ACETAMINOPHEN 5; 325 MG/1; MG/1
2 TABLET ORAL EVERY 4 HOURS PRN
Qty: 37 TABLET | Refills: 0 | Status: SHIPPED | OUTPATIENT
Start: 2017-01-11 | End: 2017-01-21

## 2017-01-11 RX ORDER — PROMETHAZINE HYDROCHLORIDE 25 MG/ML
12.5 INJECTION, SOLUTION INTRAMUSCULAR; INTRAVENOUS EVERY 4 HOURS PRN
Status: DISCONTINUED | OUTPATIENT
Start: 2017-01-11 | End: 2017-01-11 | Stop reason: HOSPADM

## 2017-01-11 RX ORDER — OXYCODONE HYDROCHLORIDE AND ACETAMINOPHEN 5; 325 MG/1; MG/1
2 TABLET ORAL EVERY 4 HOURS PRN
Status: DISCONTINUED | OUTPATIENT
Start: 2017-01-11 | End: 2017-01-11 | Stop reason: HOSPADM

## 2017-01-11 RX ORDER — GABAPENTIN 300 MG/1
600 CAPSULE ORAL EVERY 8 HOURS SCHEDULED
Status: DISCONTINUED | OUTPATIENT
Start: 2017-01-11 | End: 2017-01-11 | Stop reason: HOSPADM

## 2017-01-11 RX ORDER — ONDANSETRON 4 MG/1
4 TABLET, FILM COATED ORAL EVERY 6 HOURS PRN
Status: DISCONTINUED | OUTPATIENT
Start: 2017-01-11 | End: 2017-01-11 | Stop reason: HOSPADM

## 2017-01-11 RX ADMIN — METHOCARBAMOL 750 MG: 750 TABLET ORAL at 09:28

## 2017-01-11 RX ADMIN — HYDROMORPHONE HYDROCHLORIDE 0.5 MG: 1 INJECTION, SOLUTION INTRAMUSCULAR; INTRAVENOUS; SUBCUTANEOUS at 00:21

## 2017-01-11 RX ADMIN — ONDANSETRON 4 MG: 4 TABLET, FILM COATED ORAL at 01:21

## 2017-01-11 RX ADMIN — OXYCODONE AND ACETAMINOPHEN 2 TABLET: 5; 325 TABLET ORAL at 09:28

## 2017-01-11 RX ADMIN — DOCUSATE SODIUM 100 MG: 100 CAPSULE, LIQUID FILLED ORAL at 09:30

## 2017-01-11 RX ADMIN — SIMETHICONE CHEW TAB 80 MG 80 MG: 80 TABLET ORAL at 03:58

## 2017-01-11 RX ADMIN — SODIUM CHLORIDE 50 MG: 9 INJECTION, SOLUTION INTRAVENOUS at 09:27

## 2017-01-11 RX ADMIN — OXYCODONE AND ACETAMINOPHEN 2 TABLET: 5; 325 TABLET ORAL at 05:48

## 2017-01-11 RX ADMIN — OXYCODONE AND ACETAMINOPHEN 2 TABLET: 5; 325 TABLET ORAL at 01:21

## 2017-01-11 RX ADMIN — SERTRALINE HYDROCHLORIDE 100 MG: 100 TABLET, FILM COATED ORAL at 09:28

## 2017-01-11 RX ADMIN — SODIUM CHLORIDE, POTASSIUM CHLORIDE, SODIUM LACTATE AND CALCIUM CHLORIDE 125 ML/HR: 600; 310; 30; 20 INJECTION, SOLUTION INTRAVENOUS at 01:14

## 2017-01-11 RX ADMIN — OXYCODONE AND ACETAMINOPHEN 2 TABLET: 5; 325 TABLET ORAL at 13:30

## 2017-01-11 RX ADMIN — HYDROMORPHONE HYDROCHLORIDE 0.5 MG: 1 INJECTION, SOLUTION INTRAMUSCULAR; INTRAVENOUS; SUBCUTANEOUS at 00:11

## 2017-01-11 RX ADMIN — GABAPENTIN 600 MG: 300 CAPSULE ORAL at 13:29

## 2017-01-11 RX ADMIN — HEPARIN SODIUM 5000 UNITS: 5000 INJECTION, SOLUTION INTRAVENOUS; SUBCUTANEOUS at 09:28

## 2017-01-11 RX ADMIN — FENTANYL CITRATE 50 MCG: 50 INJECTION, SOLUTION INTRAMUSCULAR; INTRAVENOUS at 00:47

## 2017-01-11 RX ADMIN — HEPARIN SODIUM 5000 UNITS: 5000 INJECTION, SOLUTION INTRAVENOUS; SUBCUTANEOUS at 13:29

## 2017-01-11 RX ADMIN — DULOXETINE 60 MG: 60 CAPSULE, DELAYED RELEASE ORAL at 09:28

## 2017-01-11 NOTE — BRIEF OP NOTE
APPENDECTOMY LAPAROSCOPIC  Procedure Note    Lavelle Cabral  1/10/2017    Pre-op Diagnosis:   Acute appendicitis    Post-op Diagnosis:     Same    Procedure/CPT® Codes:      Procedure(s):  APPENDECTOMY LAPAROSCOPIC    Surgeon(s):  Nicholas Gifford MD    Anesthesia: General    Staff:   Circulator: Mauri Rios RN  Scrub Person: Christen Herrera  Nursing Assistant: Shaina Brian    Estimated Blood Loss: 10 mL    Specimens:                  ID Type Source Tests Collected by Time Destination   A :  Tissue Large Intestine, Appendix TISSUE EXAM Nicholas Gifford MD 1/10/2017 2322          Drains:           Findings: Acute appendicitis without rupture    Complications: None      Nicholas Gifford MD     Date: 1/10/2017  Time: 11:38 PM

## 2017-01-11 NOTE — PROGRESS NOTES
"Lavelle Cabral  1980  [id number]    Surgery Progress Note    Date of visit: 1/11/2017    Subjective   Subjective: Feeling better, but a bit sore at his incisions.         Objective     Objective    Visit Vitals   • /75   • Pulse 100   • Temp 98.8 °F (37.1 °C) (Oral)   • Resp 18   • Ht 67\" (170.2 cm)   • Wt 220 lb (99.8 kg)   • SpO2 97%   • BMI 34.46 kg/m2       Intake/Output Summary (Last 24 hours) at 01/11/17 0802  Last data filed at 01/11/17 0736   Gross per 24 hour   Intake   3600 ml   Output    610 ml   Net   2990 ml       CV:  Rhythm  regular and rate regular   L:  Clear  to auscultation bilaterally   Abd:  Bowel sounds positive , soft, minimally tender. Incisions c/d/i  Ext:  No cyanosis, clubbing, edema    Labs that are back at this time have been reviewed.        Assessment/Plan     Assessment/ Plan:    Problem List Items Addressed This Visit     Acute appendicitis - Primary - Doing well after Lap appendectomy. If tolerates PO, may D/C later today    Relevant Orders    Tissue Exam    Case request (Completed)      Other Visit Diagnoses     Leukocytosis, unspecified type        Non-intractable vomiting with nausea, unspecified vomiting type        Acute appendicitis with localized peritonitis        Relevant Orders    Case request (Completed)            Nicholas Gifford MD  1/11/2017  8:02 AM      "

## 2017-01-11 NOTE — PLAN OF CARE
Problem: Patient Care Overview (Adult)  Goal: Plan of Care Review  Outcome: Ongoing (interventions implemented as appropriate)    01/11/17 0141   Coping/Psychosocial Response Interventions   Plan Of Care Reviewed With patient   Patient Care Overview   Progress improving   Outcome Evaluation   Outcome Summary/Follow up Plan post op course without problems. Taking po meds for pain. Awaiting voiding at this time. IVFs going.. SCDs.Lap sites telfa/tegaderm. Anticipate discharge later today if tolerating       Goal: Discharge Needs Assessment  Outcome: Ongoing (interventions implemented as appropriate)    Problem: Pain, Acute (Adult)  Goal: Identify Related Risk Factors and Signs and Symptoms  Outcome: Ongoing (interventions implemented as appropriate)  Goal: Acceptable Pain Control/Comfort Level  Outcome: Ongoing (interventions implemented as appropriate)    Problem: Perioperative Period (Adult)  Goal: Signs and Symptoms of Listed Potential Problems Will be Absent or Manageable (Perioperative Period)  Outcome: Ongoing (interventions implemented as appropriate)

## 2017-01-11 NOTE — OP NOTE
DATE OF PROCEDURE: 01/10/2017    PREOPERATIVE DIAGNOSIS: Acute appendicitis.      POSTOPERATIVE DIAGNOSIS: Acute appendicitis.     PROCEDURE: Laparoscopic appendectomy.    SURGEON: Nicholas Gifford MD    SPECIMENS: Appendix and contents.    ANESTHESIA: General.     FINDINGS: Acutely inflamed appendix without rupture.     INDICATIONS: The patient is a very pleasant 36-year-old gentleman with a history of abdominal pain. Subsequent evaluation was concerning for acute appendicitis. The risks and benefits of laparoscopic appendectomy were discussed at length with the patient and his family and they agreed to proceed.     DESCRIPTION OF PROCEDURE: After obtaining informed consent, the patient was taken to the operating room and placed in the supine position. After appropriate DVT and antibiotic prophylaxis, general anesthesia was induced. The abdomen was prepped and draped in a standard sterile fashion. After infiltrating the skin with local anesthetic, a curvilinear incision was made inferior to the umbilicus. Blunt dissection was carried to the base of the umbilicus, grasped with a Kocher clamp, and elevated anteriorly. A vertical midline incision in the fascia was made. Blunt dissection was carried into the abdominal cavity. A stay suture of 0 Vicryl was then placed in a figure-of-eight fashion around the defect and a blunt trocar was advanced without difficulty in the abdominal cavity. The abdomen was insufflated with carbon dioxide gas to a pressure of 15 mmHg. At this point, the laparoscope was advanced through the trocar and the abdominal contents were inspected. There was no evidence of bowel, bladder or visceral injury with entrance of the trocar. At this point, after infiltrating appropriate areas with local anesthetic, a standard laparoscopic appendectomy port placement schema was followed. Care was taken not to encroach upon the bladder. The right lower quadrant was inspected and there was acutely inflamed  appendix without rupture. The base of the appendix was then bluntly encircled using stapling device. It was transected, divided and flushed with the cecum with care taken not to encroach on the ileocecal valve. Additional firings of the stapler were taken across the appendiceal mesentery. The appendix was placed in an Endo Catch bag and removed through the infraumbilical port site. The specimen was examined on the back table. It was complete and passed off as specimen.      The right lower quadrant was meticulously irrigated with normal saline until clear. There was some mild bleeding from the mesenteric suture line. This was reinforced with Hemoclip application. Similarly, using 3-0 silk suture in Lembert fashion the corners of the staple line on the cecum were oversewn as well with care given not to encroach on ileocecal valve. The right lower quadrant was meticulously irrigated with normal saline until clear. No evidence of further bleeding and no evidence of leak whatsoever. The remainder of the bowel looked normal. No other inflammatory processes noted. The right lower quadrant was meticulously irrigated with normal saline until clear and all trocars were removed under direct and laparoscopic visualization and the abdomen desufflated. The fascia at the infraumbilical port site was closed using the previously placed 0 Vicryl suture. The wounds were copiously irrigated with normal saline until clear and closed in each area with running subcuticular suture. The incisions were dressed with in standard sterile fashion and covered with a dry sterile dressing.      The patient recovered from anesthesia well, was extubated in the operating room and transferred to the PACU in stable condition. All sponge and needle counts were correct x2 at the completion of the case.      Nicholas Gifford MD*  MDS/co  DD: 01/10/2017 23:44:52  DT: 01/11/2017 01:52:20  Voice Rec. ID #[id number]  Voice Original ID #[id number]  Doc ID  #[id number]  Rev. #1  cc:

## 2017-01-11 NOTE — ED PROVIDER NOTES
Subjective   Patient is a 36 y.o. male presenting with vomiting.   History provided by:  Patient  Vomiting   The primary symptoms include fatigue, nausea, vomiting, diarrhea and myalgias. Primary symptoms do not include fever, abdominal pain, melena, dysuria, arthralgias or rash. The illness began today (0600).   The illness does not include constipation or back pain. Associated medical issues do not include liver disease, alcohol abuse or diverticulitis.   Pt unsure if around sick contacts but was with family member at hospital yesterday. Some nasal congestion. Denies cough or SOB. Denies urinary sx.     Last ate around 1 today. The only abdominal surgery he has had includes left inguinal hernia repair.     Review of Systems   Constitutional: Positive for fatigue. Negative for fever.   HENT: Negative for congestion, ear pain, sore throat and trouble swallowing.    Eyes: Negative for pain, redness and visual disturbance.   Respiratory: Negative for cough and shortness of breath.    Cardiovascular: Negative for chest pain and leg swelling.   Gastrointestinal: Positive for diarrhea, nausea and vomiting. Negative for abdominal pain, blood in stool, constipation and melena.   Genitourinary: Negative for difficulty urinating, dysuria and flank pain.   Musculoskeletal: Positive for myalgias. Negative for arthralgias, back pain and joint swelling.   Skin: Negative.  Negative for rash and wound.   Allergic/Immunologic: Negative.    Neurological: Negative for dizziness, syncope, numbness and headaches.   Psychiatric/Behavioral: Negative for confusion.   All other systems reviewed and are negative.      Past Medical History   Diagnosis Date   • Anxiety    • Depression    • Hypertension    • Neck injury        Allergies   Allergen Reactions   • Mustard [Allyl Isothiocyanate]    • Ultram [Tramadol Hcl]    • Amoxicillin Rash       Past Surgical History   Procedure Laterality Date   • No past surgeries     • Back surgery     •  Knee acl reconstruction         Family History   Problem Relation Age of Onset   • Diabetes Father    • Heart disease Father    • Heart disease Paternal Grandfather        Social History     Social History   • Marital status: Single     Spouse name: N/A   • Number of children: N/A   • Years of education: N/A     Social History Main Topics   • Smoking status: Never Smoker   • Smokeless tobacco: None   • Alcohol use No   • Drug use: No   • Sexual activity: Not Asked     Other Topics Concern   • None     Social History Narrative           Objective   Physical Exam   Constitutional: He is oriented to person, place, and time. He appears well-developed and well-nourished.   HENT:   Head: Atraumatic.   Nose: Nose normal.   Eyes: Conjunctivae, EOM and lids are normal. Pupils are equal, round, and reactive to light.   Neck: Normal range of motion. Neck supple.   Cardiovascular: Regular rhythm and normal heart sounds.  Tachycardia present.    Pulmonary/Chest: Effort normal and breath sounds normal.   Abdominal: Soft. He exhibits no distension. There is no tenderness. There is no rebound and no guarding.   Musculoskeletal: Normal range of motion. He exhibits no edema, tenderness or deformity.   Neurological: He is alert and oriented to person, place, and time. He has normal strength. No sensory deficit.   Skin: Skin is warm, dry and intact.   Psychiatric: He has a normal mood and affect. His behavior is normal.   Nursing note and vitals reviewed.      Procedures         ED Course  ED Course    Re-examined multiple times in ED. Pt improved with fluids but then began complaining of lower back pain.Still denying cough or SOB. He states he now has some left sided abdominal cramping. Noted he does have diffuse left abdominal TTP but no RLQ pain, guarding or rebound.     He states his nose has begun to run more and he is more congested. He still is having some generalized muscle aches. Even after 3 liters of fluids he is still  tachycardic. Discussed further evaluation and CT abd/pelvis as well as CXR and he is agreeable.     Discussed patient with Dr. Gifford, on call surgeon. He will take patient to OR. He requested Tigecycline. Unable to sign order so Pharmacist placed abx order.     Discussed CT findings with the patient and need for surgery. He is agreeable. He states the only abdominal surgery he has had was left inguinal hernia.     Course of Care      Lab Results (last 24 hours)     Procedure Component Value Units Date/Time    CBC & Differential [[id number]] Collected:  01/10/17 1658    Specimen:  Blood Updated:  01/10/17 1723    Narrative:       The following orders were created for panel order CBC & Differential.  Procedure                               Abnormality         Status                     ---------                               -----------         ------                     CBC Auto Differential[[id number]]         Abnormal            Final result                 Please view results for these tests on the individual orders.    Comprehensive Metabolic Panel [[id number]]  (Abnormal) Collected:  01/10/17 1658    Specimen:  Blood Updated:  01/10/17 1802     Glucose 108 (H) mg/dL      BUN 17 mg/dL      Creatinine 0.80 mg/dL      Sodium 141 mmol/L      Potassium 4.7 mmol/L      Chloride 100 mmol/L      CO2 27.0 mmol/L      Calcium 10.3 mg/dL      Total Protein 8.9 (H) g/dL      Albumin 5.10 (H) g/dL      ALT (SGPT) 35 U/L      AST (SGOT) 53 (H) U/L      Alkaline Phosphatase 105 (H) U/L      Total Bilirubin 1.1 mg/dL      eGFR Non African Amer 108 mL/min/1.73      Globulin 3.8 gm/dL      A/G Ratio 1.3 (L) g/dL      BUN/Creatinine Ratio 21.3      Anion Gap 14.0 (H) mmol/L     Narrative:       National Kidney [employer] Guidelines    Stage                           Description                             GFR                      1                               Normal or High                          90+  2                                Mild decrease                            60-89  3                               Moderate decrease                   30-59  4                               Severe decrease                       15-29  5                               Kidney failure                             <15    Lipase [[id number]]  (Normal) Collected:  01/10/17 1658    Specimen:  Blood Updated:  01/10/17 1802     Lipase 37 U/L     CBC Auto Differential [[id number]]  (Abnormal) Collected:  01/10/17 1658    Specimen:  Blood Updated:  01/10/17 1723     WBC 23.21 (H) 10*3/mm3      RBC 5.72 10*6/mm3      Hemoglobin 17.7 (H) g/dL      Hematocrit 48.6 %      MCV 85.0 fL      MCH 30.9 pg      MCHC 36.4 (H) g/dL      RDW 12.7 %      RDW-SD 38.9 fl      MPV 9.4 fL      Platelets 297 10*3/mm3      Neutrophil % 93.1 (H) %      Lymphocyte % 3.6 (L) %      Monocyte % 2.2 %      Eosinophil % 0.7 %      Basophil % 0.1 %      Immature Grans % 0.3 %      Neutrophils, Absolute 21.62 (H) 10*3/mm3      Lymphocytes, Absolute 0.84 10*3/mm3      Monocytes, Absolute 0.50 10*3/mm3      Eosinophils, Absolute 0.16 10*3/mm3      Basophils, Absolute 0.02 10*3/mm3      Immature Grans, Absolute 0.07 (H) 10*3/mm3     Influenza Antigen [[id number]]  (Normal) Collected:  01/10/17 1909    Specimen:  Swab from Nasopharynx Updated:  01/10/17 1930     Influenza A Ag, EIA Negative      Influenza B Ag, EIA Negative     Urinalysis With / Culture If Indicated [[id number]]  (Abnormal) Collected:  01/10/17 2108    Specimen:  Urine from Urine, Clean Catch Updated:  01/10/17 2136     Color, UA Yellow      Appearance, UA Clear      pH, UA 5.5      Specific Gravity, UA 1.024      Glucose, UA Negative      Ketones, UA Negative      Bilirubin, UA Negative      Blood, UA Moderate (2+) (A)      Protein, UA Negative      Leuk Esterase, UA Negative      Nitrite, UA Negative      Urobilinogen, UA 0.2 E.U./dL     Urinalysis, Microscopic Only [[id number]]  (Abnormal) Collected:  01/10/17 2108    Specimen:  Urine  from Urine, Clean Catch Updated:  01/10/17 2136     RBC, UA 13-20 (A) /HPF      WBC, UA 0-2 (A) /HPF      Bacteria, UA None Seen /HPF      Squamous Epithelial Cells, UA 0-2 /HPF      Hyaline Casts, UA 0-6 /LPF      Methodology Automated Microscopy           Note: In addition to lab results from this visit, the labs listed above may include labs taken at another facility or during a different encounter within the last 24 hours. Please correlate lab times with ED admission and discharge times for further clarification of the services performed during this visit.    XR Chest 2 View   Final Result   Abnormal     Unremarkable study without an active lung lesion.             THIS DOCUMENT HAS BEEN ELECTRONICALLY SIGNED BY JUSTO SHERIFF MD      CT Abdomen Pelvis Without Contrast   Final Result   Abnormal     ACUTE APPENDICITIS without periappendiceal abscess or perforation.         FATTY infiltration of the liver without cholelithiasis or acute    cholecystitis. Recommend correlation with liver function tests.        Other nonemergent findings as described.       NOTE: Suboptimal evaluation of bowel loops and abdominal organs due to lack of    oral and intravenous contrast.          THIS DOCUMENT HAS BEEN ELECTRONICALLY SIGNED BY JUSTO SHERIFF MD          Vitals:    01/10/17 1839 01/10/17 2000 01/10/17 2001 01/10/17 2104   BP: 132/98 110/92 118/86 121/89   BP Location: Right arm      Patient Position: Lying      Pulse: 116 (!) 129 (!) 128 116   Resp: 16  18 16   Temp: 99.6 °F (37.6 °C)      TempSrc: Oral      SpO2: 97% 97% 97% 98%   Weight:       Height:           Medications   sodium chloride 0.9 % flush 10 mL (10 mL Intravenous Given 1/10/17 2106)   tigecycline (TYGACIL) 100 mg in sodium chloride 0.9 % 100 mL IVPB (not administered)   tigecycline (TYGACIL) 50 mg in sodium chloride 0.9 % 100 mL IVPB (not administered)   sodium chloride 0.9 % bolus 1,000 mL (0 mL Intravenous Stopped 1/10/17 6485)   ondansetron (ZOFRAN)  injection 4 mg (4 mg Intravenous Given 1/10/17 1658)   sodium chloride 0.9 % bolus 1,000 mL (0 mL Intravenous Stopped 1/10/17 1836)   sodium chloride 0.9 % bolus 1,000 mL (0 mL Intravenous Stopped 1/10/17 1938)   ibuprofen (ADVIL,MOTRIN) 100 MG/5ML suspension 600 mg (600 mg Oral Given 1/10/17 1905)   ondansetron (ZOFRAN) injection 4 mg (4 mg Intravenous Given 1/10/17 1915)   ketorolac (TORADOL) injection 30 mg (30 mg Intravenous Given 1/10/17 2105)       ECG/EMG Results (last 24 hours)     Procedure Component Value Units Date/Time    ECG 12 Lead [[id number]] Collected:  01/10/17 1642     Updated:  01/10/17 1826                      MDM    Final diagnoses:   Other acute appendicitis   Leukocytosis, unspecified type   Non-intractable vomiting with nausea, unspecified vomiting type            STORMY Palmer  01/11/17 0732

## 2017-01-11 NOTE — ANESTHESIA PREPROCEDURE EVALUATION
Anesthesia Evaluation     Patient summary reviewed and Nursing notes reviewed    No history of anesthetic complications (no GA:  no family hx of anesthesia problems)   Airway   Mallampati: II  TM distance: >3 FB  Neck ROM: full  possible difficult intubation  Dental - normal exam     Pulmonary     breath sounds clear to auscultation  (+) sleep apnea,   (-) not a smoker    ROS comment: Sinus congestion  Cardiovascular   Exercise tolerance: good (4-7 METS)  (+) hypertension, past MI (2001) ,   (-) angina, LOWE    ECG reviewed  Rhythm: regular  Rate: normal    Neuro/Psych  (+) seizures (after ultram) well controlled, psychiatric history Anxiety and Depression,    GI/Hepatic/Renal/Endo    (+) obesity,    (-) GERD, hepatitis, liver disease, renal disease, diabetes, hypothyroidism    ROS Comment: Acute appendicitis; nausea/vomiting    Musculoskeletal     (+) neck pain,   Abdominal   (+) obese,    Substance History - negative use     OB/GYN          Other   (+) arthritis                        Anesthesia Plan    ASA 2 - emergent     general   (Labs/studies reviewed  RSI)  intravenous induction   Anesthetic plan and risks discussed with patient.

## 2017-01-11 NOTE — PROGRESS NOTES
Discharge Planning Assessment  [facility]     Patient Name: Lavelle Cabral  MRN: [id number]  Today's Date: 1/11/2017    Admit Date: 1/10/2017          Discharge Needs Assessment       01/11/17 1047    Living Environment    Lives With friend(s)    Living Arrangements apartment    Provides Primary Care For no one    Primary Care Provided By other (see comments)   Friend    Quality Of Family Relationships unable to assess    Able to Return to Prior Living Arrangements yes    Discharge Needs Assessment    Concerns To Be Addressed basic needs concerns;no discharge needs identified    Readmission Within The Last 30 Days no previous admission in last 30 days    Anticipated Changes Related to Illness other (see comments)   Post op recuperation    Equipment Currently Used at Home none    Equipment Needed After Discharge none    Transportation Available car    Discharge Disposition home or self-care    Discharge Contact Information if Applicable [phone]            Discharge Plan       01/11/17 1048    Case Management/Social Work Plan    Plan To home    Patient/Family In Agreement With Plan yes    Additional Comments Anticipates discharge to home with no discharge planning needs identified.         Discharge Placement     No information found                Demographic Summary       01/11/17 1034    Referral Information    Referral Source admission list    Record Reviewed medical record    Contact Information    Permission Granted to Share Information With     Primary Care Physician Information    Name Cortney Barleah            Functional Status       01/11/17 1035    Functional Status Current    Ambulation 0-->independent    Transferring 0-->independent    Toileting 0-->independent    Bathing 0-->independent    Dressing 0-->independent    Eating 0-->independent    Communication 0-->understands/communicates without difficulty    Change in Functional Status Since Onset of Current Illness/Injury no     Functional Status Prior    Ambulation 0-->independent    Transferring 0-->independent    Toileting 0-->independent    Bathing 0-->independent    Dressing 0-->independent    Eating 0-->independent    Communication 0-->understands/communicates without difficulty    IADL    Medications independent    Meal Preparation independent    Housekeeping independent    Laundry independent    Shopping independent    Oral Care independent    Activity Tolerance    Current Activity Limitations other (see comments)   post op restrictions    Usual Activity Tolerance good    Current Activity Tolerance good    Employment/Financial    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

## 2017-01-11 NOTE — ANESTHESIA POSTPROCEDURE EVALUATION
Patient: Lavelle Mart Cabral    Procedure Summary     Date Anesthesia Start Anesthesia Stop Room / Location    01/10/17 7768 [id number] [facility]       Procedure Diagnosis Surgeon Provider    APPENDECTOMY LAPAROSCOPIC (N/A Abdomen) No diagnosis on file. MD Gia Larson MD          Anesthesia Type: general  Last vitals  BP   131/79   Temp   98.1F   Pulse  86   Resp   16   SpO2   100     Post Anesthesia Care and Evaluation    Patient location during evaluation: PACU  Patient participation: complete - patient participated  Level of consciousness: awake and alert  Pain management: adequate  Airway patency: patent  Anesthetic complications: No anesthetic complications  Cardiovascular status: hemodynamically stable and acceptable  Respiratory status: nonlabored ventilation, acceptable and nasal cannula  Hydration status: acceptable    Comments: No apparent anesthesia complications noted at this time

## 2017-01-11 NOTE — PLAN OF CARE
Problem: Patient Care Overview (Adult)  Goal: Plan of Care Review    01/11/17 0141   Outcome Evaluation   Outcome Summary/Follow up Plan post op course without problems. Taking po meds for pain. Awaiting voiding at this time. IVFs going.. SCDs.Lap sites telfa/tegaderm. May not be discharged today as he has IV atbs ordered

## 2017-01-11 NOTE — ANESTHESIA PROCEDURE NOTES
Airway  Urgency: elective    Date/Time: 1/10/2017 10:59 PM  Airway not difficult    General Information and Staff    Patient location during procedure: OR  Anesthesiologist: BINDU KEE    Indications and Patient Condition  Indications for airway management: airway protection    Preoxygenated: yes  MILS maintained throughout  Mask difficulty assessment: 0 - not attempted    Final Airway Details  Final airway type: endotracheal airway      Successful airway: ETT  Cuffed: yes   Successful intubation technique: direct laryngoscopy  Facilitating devices/methods: cricoid pressure and intubating stylet  Endotracheal tube insertion site: oral  Blade: Sharron  Blade size: #3  ETT size: 7.5 mm  Cormack-Lehane Classification: grade I - full view of glottis  Placement verified by: chest auscultation and capnometry   Cuff volume (mL): 7  Measured from: teeth  ETT to teeth (cm): 22  Number of attempts at approach: 1    Additional Comments  Atraumatic

## 2017-01-11 NOTE — H&P
"Lavelle Cabral  [id number]  1980       Patient Care Team:  Cortney Gill MD as PCP - General (Family Medicine)      General Surgery History and Physical    Date: 01/10/17    Chief Complaint - abdominal pain    Subjective      Patient is a 36 y.o. male who presents with abdominal pain. He reports a 1 day history of lower abdominal pain, worse with motion, sharp, without radiation. He reports associated nausea and vomiting of nonbloody, nonbilious material. He has no history of similar pain. He reports that he has felt \"sweaty and hot\" for 3 days. His last PO intake was earlier today (1PM), and his last BM was this morning, normal.      Allergy:   Allergies   Allergen Reactions   • Mustard [Allyl Isothiocyanate]    • Ultram [Tramadol Hcl]    • Amoxicillin Rash       Medications:    tigecycline (TYGACIL) IVPB 100 mg Intravenous Once   [START ON 1/11/2017] tigecycline (TYGACIL) IVPB 50 mg Intravenous Q12H        No current facility-administered medications on file prior to encounter.      Current Outpatient Prescriptions on File Prior to Encounter   Medication Sig   • aspirin 81 MG chewable tablet Take 81 mg by mouth daily   • cyclobenzaprine (FLEXERIL) 10 MG tablet Take 1 tablet by mouth 3 (Three) Times a Day As Needed for muscle spasms.   • DULoxetine (CYMBALTA) 20 MG capsule Take 60 mg by mouth Daily.   • valsartan-hydrochlorothiazide (DIOVAN-HCT) 160-25 MG per tablet Take 1 tablet by mouth 2 (two) times a day.   • acetaminophen-codeine (TYLENOL/CODEINE #3) 300-30 MG per tablet Take 1 tablet by mouth every 4 (four) hours as needed for moderate pain (4-6).   • clindamycin (CLEOCIN) 300 MG capsule Take 1 capsule by mouth 4 (four) times a day.   • clindamycin (CLEOCIN) 300 MG capsule Take 1 capsule by mouth 4 (four) times a day.   • cloNIDine (CATAPRES) 0.1 MG tablet Take 0.1 mg by mouth Every Night.   • cyclobenzaprine (FLEXERIL) 10 MG tablet Take 1 tablet by mouth 3 (three) times a day as needed for " muscle spasms.   • diclofenac (VOLTAREN) 50 MG EC tablet Take 1 tablet by mouth 3 (Three) Times a Day.   • gabapentin (NEURONTIN) 600 MG tablet 3 (Three) Times a Day.   • HYDROcodone-acetaminophen (NORCO)  MG per tablet Take 1 tablet by mouth every 6 (six) hours as needed for moderate pain (4-6).   • HYDROcodone-acetaminophen (NORCO) 5-325 MG per tablet Take 1 tablet by mouth every 6 (six) hours as needed for severe pain (7-10).   • HYDROcodone-acetaminophen (NORCO) 5-325 MG per tablet Take 1 tablet by mouth every 6 (six) hours as needed for severe pain (7-10).   • ibuprofen (ADVIL,MOTRIN) 600 MG tablet Take 1 tablet by mouth Every 6 (Six) Hours As Needed for moderate pain (4-6) for up to 15 doses. (Patient taking differently: Take 400 mg by mouth Every 6 (Six) Hours As Needed for moderate pain (4-6).)   • ibuprofen (ADVIL,MOTRIN) 800 MG tablet Take 1 tablet by mouth every 8 (eight) hours as needed for mild pain (1-3).   • LORazepam (ATIVAN) 1 MG tablet Take 1 tablet by mouth every 8 (eight) hours as needed for anxiety.   • methocarbamol (ROBAXIN) 750 MG tablet Take 750 mg by mouth 2 (two) times a day.   • MethylPREDNISolone (MEDROL, BAM,) 4 MG tablet Take as directed on package instructions.   • ondansetron (ZOFRAN) 4 MG tablet Take 4 mg by mouth every 8 (eight) hours as needed for nausea or vomiting.   • ondansetron ODT (ZOFRAN-ODT) 4 MG disintegrating tablet Take 1 tablet by mouth every 6 (six) hours as needed for nausea or vomiting for up to 15 doses.   • sertraline (ZOLOFT) 100 MG tablet Take 100 mg by mouth Daily.       PMHx:   Past Medical History   Diagnosis Date   • Anxiety    • Depression    • Hypertension    • Neck injury    - sleep apnea  - Questionable MI      Past Surgical History:   1) L IH as child    Family History: DM    Social History: Pt lives in [place] .    Tobacco use: None     EtOH use : None    Illicit drug use: None      Review of Systems   Pertinent items are noted in HPI, all  "other systems reviewed and negative     Constitutional: No fevers, chills or malaise   Eyes: Denies visual changes    Cardiovascular: Denies chest pain, palpitations   Pulmonary: Denies cough or shortness of breath   Abdominal/ GI: See HPI    Genitourinary: Denies dysuria or hematuria   Musculoskeletal: Denies any but chronic joint aches, pains or deformities   Psychiatric: No recent mood changes   Neurologic: No paresthesias or loss of function          Objective     Physical Exam:      Vital Signs  Visit Vitals   • /94 (BP Location: Right arm, Patient Position: Standing)   • Pulse 116   • Temp 98.4 °F (36.9 °C) (Oral)   • Resp 16   • Ht 67\" (170.2 cm)   • Wt 220 lb (99.8 kg)   • SpO2 100%   • BMI 34.46 kg/m2       Intake/Output Summary (Last 24 hours) at 01/10/17 2234  Last data filed at 01/10/17 1938   Gross per 24 hour   Intake   3000 ml   Output      0 ml   Net   3000 ml         Physical Exam:    Head: Normocephalic, atraumatic.   Eyes: Pupils equal, round, react to light and accomodation.   Mouth: Oral mucosa without lesions,   Neck: No masses, lymphadenopathy or carotid bruits bilaterally   CV: Rhythm  regular and rate regular  , no  murmurs, rubs or gallops  Lungs: Clear   to auscultation bilaterally   Abdomen: Bowel sounds positive  , soft, tender lower abdomen, most in RLQ  Groin : No obvious hernias bilaterally   Extremities:  No cyanosis, clubbing or edema bilaterally   Lymphatics: No abnormal lymphadenopathy appreciated   Neurologic: No gross deficits         Results Review:   I have personally reviewed all of the lab and imaging results available at this time . CT personally reviewed, shows acute appendicitis without perforation. No free air or fluid. Redundant sigmoid without volvulus. WBC 21K.        Assessment/Plan     Assessment and Plan:    Problem List Items Addressed This Visit     None      Visit Diagnoses     Other acute appendicitis    -  Primary - To OR emergently for Lap appendectomy. " Risks/ benefits discussed with patient who agrees to proceed.    Leukocytosis, unspecified type        Non-intractable vomiting with nausea, unspecified vomiting type        Acute appendicitis with localized peritonitis        Relevant Orders    Case request (Completed)              I discussed the patients findings and my recommendations with the patient and/or family, as well as the primary team     Nicholas Gifford MD  01/10/17  10:34 PM

## 2017-01-11 NOTE — PLAN OF CARE
Problem: Patient Care Overview (Adult)  Goal: Plan of Care Review    01/11/17 1509   Coping/Psychosocial Response Interventions   Plan Of Care Reviewed With patient   Patient Care Overview   Progress improving   Outcome Evaluation   Outcome Summary/Follow up Plan Pt doing well. PO pain meds working well for pain.Lap sites intact. VSS. Denies any other requests. Pt to d/c home this afternoon.

## 2017-01-12 LAB
CYTO UR: NORMAL
LAB AP CASE REPORT: NORMAL
LAB AP CLINICAL INFORMATION: NORMAL
LAB AP DIAGNOSIS COMMENT: NORMAL
Lab: NORMAL
PATH REPORT.FINAL DX SPEC: NORMAL
PATH REPORT.GROSS SPEC: NORMAL

## 2017-01-17 ENCOUNTER — APPOINTMENT (OUTPATIENT)
Dept: CT IMAGING | Facility: HOSPITAL | Age: 37
End: 2017-01-17

## 2017-01-17 ENCOUNTER — HOSPITAL ENCOUNTER (EMERGENCY)
Facility: HOSPITAL | Age: 37
Discharge: HOME | End: 2017-01-17
Attending: EMERGENCY MEDICINE | Admitting: EMERGENCY MEDICINE

## 2017-01-17 ENCOUNTER — APPOINTMENT (OUTPATIENT)
Dept: GENERAL RADIOLOGY | Facility: HOSPITAL | Age: 37
End: 2017-01-17

## 2017-01-17 VITALS
TEMPERATURE: 97.8 F | DIASTOLIC BLOOD PRESSURE: 92 MMHG | BODY MASS INDEX: 34.53 KG/M2 | RESPIRATION RATE: 18 BRPM | WEIGHT: 220 LBS | OXYGEN SATURATION: 97 % | HEART RATE: 97 BPM | HEIGHT: 67 IN | SYSTOLIC BLOOD PRESSURE: 124 MMHG

## 2017-01-17 DIAGNOSIS — R31.9 HEMATURIA: ICD-10-CM

## 2017-01-17 DIAGNOSIS — R10.9 ABDOMINAL PAIN, UNSPECIFIED LOCATION: Primary | ICD-10-CM

## 2017-01-17 DIAGNOSIS — D72.829 LEUKOCYTOSIS, UNSPECIFIED TYPE: ICD-10-CM

## 2017-01-17 LAB
ALBUMIN SERPL-MCNC: 4.5 G/DL (ref 3.2–4.8)
ALBUMIN/GLOB SERPL: 1.5 G/DL (ref 1.5–2.5)
ALP SERPL-CCNC: 102 U/L (ref 25–100)
ALT SERPL W P-5'-P-CCNC: 72 U/L (ref 7–40)
ANION GAP SERPL CALCULATED.3IONS-SCNC: 11 MMOL/L (ref 3–11)
AST SERPL-CCNC: 70 U/L (ref 0–33)
BACTERIA UR QL AUTO: ABNORMAL /HPF
BASOPHILS # BLD AUTO: 0.03 10*3/MM3 (ref 0–0.2)
BASOPHILS NFR BLD AUTO: 0.2 % (ref 0–1)
BILIRUB SERPL-MCNC: 0.4 MG/DL (ref 0.3–1.2)
BILIRUB UR QL STRIP: NEGATIVE
BUN BLD-MCNC: 13 MG/DL (ref 9–23)
BUN/CREAT SERPL: 21.7 (ref 7–25)
CALCIUM SPEC-SCNC: 9.8 MG/DL (ref 8.7–10.4)
CHLORIDE SERPL-SCNC: 108 MMOL/L (ref 99–109)
CLARITY UR: CLEAR
CO2 SERPL-SCNC: 26 MMOL/L (ref 20–31)
COLOR UR: YELLOW
CREAT BLD-MCNC: 0.6 MG/DL (ref 0.6–1.3)
DEPRECATED RDW RBC AUTO: 41.4 FL (ref 37–54)
EOSINOPHIL # BLD AUTO: 0.24 10*3/MM3 (ref 0.1–0.3)
EOSINOPHIL NFR BLD AUTO: 1.7 % (ref 0–3)
ERYTHROCYTE [DISTWIDTH] IN BLOOD BY AUTOMATED COUNT: 13 % (ref 11.3–14.5)
GFR SERPL CREATININE-BSD FRML MDRD: >150 ML/MIN/1.73
GLOBULIN UR ELPH-MCNC: 3.1 GM/DL
GLUCOSE BLD-MCNC: 101 MG/DL (ref 70–100)
GLUCOSE UR STRIP-MCNC: NEGATIVE MG/DL
HCT VFR BLD AUTO: 44.7 % (ref 38.9–50.9)
HGB BLD-MCNC: 15.3 G/DL (ref 13.1–17.5)
HGB UR QL STRIP.AUTO: ABNORMAL
HYALINE CASTS UR QL AUTO: ABNORMAL /LPF
IMM GRANULOCYTES # BLD: 0.04 10*3/MM3 (ref 0–0.03)
IMM GRANULOCYTES NFR BLD: 0.3 % (ref 0–0.6)
KETONES UR QL STRIP: NEGATIVE
LEUKOCYTE ESTERASE UR QL STRIP.AUTO: NEGATIVE
LIPASE SERPL-CCNC: 37 U/L (ref 6–51)
LYMPHOCYTES # BLD AUTO: 3.1 10*3/MM3 (ref 0.6–4.8)
LYMPHOCYTES NFR BLD AUTO: 21.7 % (ref 24–44)
MCH RBC QN AUTO: 29.8 PG (ref 27–31)
MCHC RBC AUTO-ENTMCNC: 34.2 G/DL (ref 32–36)
MCV RBC AUTO: 87.1 FL (ref 80–99)
MONOCYTES # BLD AUTO: 0.63 10*3/MM3 (ref 0–1)
MONOCYTES NFR BLD AUTO: 4.4 % (ref 0–12)
NEUTROPHILS # BLD AUTO: 10.23 10*3/MM3 (ref 1.5–8.3)
NEUTROPHILS NFR BLD AUTO: 71.7 % (ref 41–71)
NITRITE UR QL STRIP: NEGATIVE
PH UR STRIP.AUTO: 6.5 [PH] (ref 5–8)
PLATELET # BLD AUTO: 332 10*3/MM3 (ref 150–450)
PMV BLD AUTO: 9.1 FL (ref 6–12)
POTASSIUM BLD-SCNC: 4.6 MMOL/L (ref 3.5–5.5)
PROT SERPL-MCNC: 7.6 G/DL (ref 5.7–8.2)
PROT UR QL STRIP: ABNORMAL
RBC # BLD AUTO: 5.13 10*6/MM3 (ref 4.2–5.76)
RBC # UR: ABNORMAL /HPF
REF LAB TEST METHOD: ABNORMAL
SODIUM BLD-SCNC: 145 MMOL/L (ref 132–146)
SP GR UR STRIP: 1.02 (ref 1–1.03)
SQUAMOUS #/AREA URNS HPF: ABNORMAL /HPF
UROBILINOGEN UR QL STRIP: ABNORMAL
WBC NRBC COR # BLD: 14.27 10*3/MM3 (ref 3.5–10.8)
WBC UR QL AUTO: ABNORMAL /HPF

## 2017-01-17 PROCEDURE — 74000 HC ABDOMEN KUB: CPT

## 2017-01-17 PROCEDURE — 25010000002 ONDANSETRON PER 1 MG: Performed by: NURSE PRACTITIONER

## 2017-01-17 PROCEDURE — 96375 TX/PRO/DX INJ NEW DRUG ADDON: CPT

## 2017-01-17 PROCEDURE — 80053 COMPREHEN METABOLIC PANEL: CPT | Performed by: NURSE PRACTITIONER

## 2017-01-17 PROCEDURE — 96376 TX/PRO/DX INJ SAME DRUG ADON: CPT

## 2017-01-17 PROCEDURE — 25510000001 DIATRIZOATE MEGLUMINE & SODIUM PER 1 ML: Performed by: NURSE PRACTITIONER

## 2017-01-17 PROCEDURE — 25010000002 HYDROMORPHONE PER 4 MG: Performed by: NURSE PRACTITIONER

## 2017-01-17 PROCEDURE — 74176 CT ABD & PELVIS W/O CONTRAST: CPT

## 2017-01-17 PROCEDURE — 85025 COMPLETE CBC W/AUTO DIFF WBC: CPT | Performed by: NURSE PRACTITIONER

## 2017-01-17 PROCEDURE — 99284 EMERGENCY DEPT VISIT MOD MDM: CPT

## 2017-01-17 PROCEDURE — 83690 ASSAY OF LIPASE: CPT | Performed by: NURSE PRACTITIONER

## 2017-01-17 PROCEDURE — 81001 URINALYSIS AUTO W/SCOPE: CPT | Performed by: NURSE PRACTITIONER

## 2017-01-17 PROCEDURE — 96361 HYDRATE IV INFUSION ADD-ON: CPT

## 2017-01-17 PROCEDURE — 96374 THER/PROPH/DIAG INJ IV PUSH: CPT

## 2017-01-17 RX ORDER — HYDROCODONE BITARTRATE AND ACETAMINOPHEN 5; 325 MG/1; MG/1
1-2 TABLET ORAL EVERY 6 HOURS PRN
Qty: 12 TABLET | Refills: 0 | Status: SHIPPED | OUTPATIENT
Start: 2017-01-17 | End: 2017-04-30

## 2017-01-17 RX ORDER — HYDROMORPHONE HYDROCHLORIDE 1 MG/ML
0.5 INJECTION, SOLUTION INTRAMUSCULAR; INTRAVENOUS; SUBCUTANEOUS ONCE
Status: COMPLETED | OUTPATIENT
Start: 2017-01-17 | End: 2017-01-17

## 2017-01-17 RX ORDER — SODIUM CHLORIDE 0.9 % (FLUSH) 0.9 %
10 SYRINGE (ML) INJECTION AS NEEDED
Status: DISCONTINUED | OUTPATIENT
Start: 2017-01-17 | End: 2017-01-17 | Stop reason: HOSPADM

## 2017-01-17 RX ORDER — ONDANSETRON 2 MG/ML
4 INJECTION INTRAMUSCULAR; INTRAVENOUS ONCE
Status: COMPLETED | OUTPATIENT
Start: 2017-01-17 | End: 2017-01-17

## 2017-01-17 RX ADMIN — HYDROMORPHONE HYDROCHLORIDE 0.5 MG: 1 INJECTION, SOLUTION INTRAMUSCULAR; INTRAVENOUS; SUBCUTANEOUS at 16:26

## 2017-01-17 RX ADMIN — DIATRIZOATE MEGLUMINE AND DIATRIZOATE SODIUM 15 ML: 660; 100 LIQUID ORAL; RECTAL at 12:18

## 2017-01-17 RX ADMIN — ONDANSETRON 4 MG: 2 INJECTION INTRAMUSCULAR; INTRAVENOUS at 09:48

## 2017-01-17 RX ADMIN — Medication 10 ML: at 16:25

## 2017-01-17 RX ADMIN — HYDROMORPHONE HYDROCHLORIDE 0.5 MG: 1 INJECTION, SOLUTION INTRAMUSCULAR; INTRAVENOUS; SUBCUTANEOUS at 10:17

## 2017-01-17 RX ADMIN — HYDROMORPHONE HYDROCHLORIDE 0.5 MG: 1 INJECTION, SOLUTION INTRAMUSCULAR; INTRAVENOUS; SUBCUTANEOUS at 12:18

## 2017-01-17 RX ADMIN — SODIUM CHLORIDE 1000 ML: 9 INJECTION, SOLUTION INTRAVENOUS at 09:43

## 2017-01-17 NOTE — ED PROVIDER NOTES
Subjective   HPI Comments: Lower abd pain sp appy 1/10 by Dr. Gifford. Some NV.    Patient is a 36 y.o. male presenting with abdominal pain.   Abdominal Pain   Pain location:  Generalized  Pain quality: aching    Pain radiates to:  Does not radiate  Onset quality:  Gradual  Timing:  Constant  Progression:  Waxing and waning  Chronicity:  New  Relieved by:  Nothing  Worsened by:  Movement      Review of Systems   Gastrointestinal: Positive for abdominal pain.   All other systems reviewed and are negative.      Past Medical History   Diagnosis Date   • Anxiety    • Depression    • Hypertension    • Neck injury        Allergies   Allergen Reactions   • Mustard [Allyl Isothiocyanate]    • Ultram [Tramadol Hcl]    • Amoxicillin Rash       Past Surgical History   Procedure Laterality Date   • No past surgeries     • Back surgery     • Knee acl reconstruction     • Appendectomy N/A 1/10/2017     Procedure: APPENDECTOMY LAPAROSCOPIC;  Surgeon: Nicholas Gifford MD;  Location: [facility] OR;  Service:        Family History   Problem Relation Age of Onset   • Diabetes Father    • Heart disease Father    • Heart disease Paternal Grandfather        Social History     Social History   • Marital status: Single     Spouse name: N/A   • Number of children: N/A   • Years of education: N/A     Social History Main Topics   • Smoking status: Never Smoker   • Smokeless tobacco: None   • Alcohol use No   • Drug use: No   • Sexual activity: Not Asked     Other Topics Concern   • None     Social History Narrative           Objective   Physical Exam   Constitutional: He is oriented to person, place, and time. He appears well-developed and well-nourished.   HENT:   Head: Normocephalic and atraumatic.   Right Ear: External ear normal.   Left Ear: External ear normal.   Nose: Nose normal.   Mouth/Throat: Oropharynx is clear and moist.   Eyes: Conjunctivae and EOM are normal. Pupils are equal, round, and reactive to light.   Neck: Normal range of  motion. Neck supple.   Cardiovascular: Normal rate, regular rhythm, normal heart sounds and intact distal pulses.    Pulmonary/Chest: Effort normal and breath sounds normal.   Abdominal: Soft. Bowel sounds are normal. There is tenderness.   Musculoskeletal: Normal range of motion.   Neurological: He is alert and oriented to person, place, and time.   Skin: Skin is warm and dry.   Psychiatric: He has a normal mood and affect. His behavior is normal. Judgment normal.       Procedures         ED Course  ED Course   Comment By Time   I spoke with dr. Gifford who advised get a CT with PO contast VIOLETTA Grant 01/17 1138   Dr. Gifford will look at CT once it is completed VIOLETTA Grant 01/17 1441   Dr. Gifford has reviewed the CT and has seen the pt in person.    Will give short course of pain meds. Well aware of the ss of worsening condition. VIOLETTA Grant 01/17 1604                  [facility]    Final diagnoses:   Abdominal pain, unspecified location   Hematuria   Leukocytosis, unspecified type            VIOLETTA Grant  01/17/17 1617

## 2017-01-17 NOTE — PROGRESS NOTES
"Lavelle Cabral  1980  [id number]    Surgery Progress Note    Date of visit: 1/17/2017    Subjective   Subjective: Pt well known to me, s/p Lap appendectomy 1 week ago for acute appendicitis. Discharged home the following day. He had run out of pain medication yesterday, and called our office. Since we had already renewed his perscription once, we recommended switching to PO ibuprofen. We also offered that he be seen in the office yesterday, but he declined. He was concerned about the pain, and presented to the ED. He reports tolerating diet, and having normal bowel function. No fevers. The pain is around the umbilicus, worse with activity. No other complaints. No night sweats or fevers.         Objective     Objective    Visit Vitals   • /92 (Patient Position: Lying)   • Pulse 97   • Temp 97.8 °F (36.6 °C) (Oral)   • Resp 18   • Ht 67\" (170.2 cm)   • Wt 220 lb (99.8 kg)   • SpO2 97%   • BMI 34.46 kg/m2     No intake or output data in the 24 hours ending 01/17/17 1627    CV:  Rhythm  regular and rate regular   L:  Clear  to auscultation bilaterally   Abd:  Bowel sounds positive , soft, minimally tender. Incisions c/d/i without erythema.  Ext:  No cyanosis, clubbing, edema    Labs that are back at this time have been reviewed. WBC 14K, down from previous HGB stable. Pathology reviewed. CT scan personally reviewed. Contrast reaches the rectum. No free air, free fluid, abscess or obstruction. No acute findings.       Assessment/Plan     Assessment/ Plan:    Problem List Items Addressed This Visit     None      Visit Diagnoses     Abdominal pain, unspecified location    -  Primary- I suspect this is just post-op pain related to his increasing activity. No evidence of hernia or other acute process. I personally spoke with the patient, reassured him, and agree with discharge from the ER with follow-up as scheduled. He seems pleased.    Hematuria        Leukocytosis, unspecified type                Nicholas " PAULA Gifford MD  1/17/2017  4:27 PM

## 2017-02-26 ENCOUNTER — HOSPITAL ENCOUNTER (EMERGENCY)
Facility: HOSPITAL | Age: 37
Discharge: HOME | End: 2017-02-26
Attending: EMERGENCY MEDICINE | Admitting: EMERGENCY MEDICINE

## 2017-02-26 VITALS
WEIGHT: 220 LBS | TEMPERATURE: 98 F | SYSTOLIC BLOOD PRESSURE: 126 MMHG | OXYGEN SATURATION: 96 % | RESPIRATION RATE: 18 BRPM | HEIGHT: 67 IN | BODY MASS INDEX: 34.53 KG/M2 | DIASTOLIC BLOOD PRESSURE: 84 MMHG | HEART RATE: 101 BPM

## 2017-02-26 DIAGNOSIS — S02.5XXB OPEN FRACTURE OF TOOTH, INITIAL ENCOUNTER: Primary | ICD-10-CM

## 2017-02-26 PROCEDURE — 99283 EMERGENCY DEPT VISIT LOW MDM: CPT

## 2017-02-26 RX ORDER — HYDROCODONE BITARTRATE AND ACETAMINOPHEN 5; 325 MG/1; MG/1
1 TABLET ORAL EVERY 6 HOURS PRN
Qty: 2 TABLET | Refills: 0 | Status: SHIPPED | OUTPATIENT
Start: 2017-02-26 | End: 2017-04-30

## 2017-04-05 ENCOUNTER — HOSPITAL ENCOUNTER (EMERGENCY)
Facility: HOSPITAL | Age: 37
Discharge: HOME | End: 2017-04-05
Attending: EMERGENCY MEDICINE | Admitting: EMERGENCY MEDICINE

## 2017-04-05 ENCOUNTER — APPOINTMENT (OUTPATIENT)
Dept: GENERAL RADIOLOGY | Facility: HOSPITAL | Age: 37
End: 2017-04-05

## 2017-04-05 VITALS
HEIGHT: 67 IN | RESPIRATION RATE: 18 BRPM | SYSTOLIC BLOOD PRESSURE: 135 MMHG | DIASTOLIC BLOOD PRESSURE: 87 MMHG | TEMPERATURE: 98.3 F | BODY MASS INDEX: 33.74 KG/M2 | OXYGEN SATURATION: 100 % | HEART RATE: 104 BPM | WEIGHT: 215 LBS

## 2017-04-05 DIAGNOSIS — S60.022A CONTUSION OF LEFT INDEX FINGER WITHOUT DAMAGE TO NAIL, INITIAL ENCOUNTER: Primary | ICD-10-CM

## 2017-04-05 PROCEDURE — 73130 X-RAY EXAM OF HAND: CPT

## 2017-04-05 PROCEDURE — 99283 EMERGENCY DEPT VISIT LOW MDM: CPT

## 2017-04-05 NOTE — ED PROVIDER NOTES
Subjective   HPI Comments: 36-year-old male complains of left index finger pain after hitting the finger with a hammer accidentally.  No lacerations.  The nail is intact.  The patient is right-hand dominant.  Past medical history of hypertension.    Patient is a 36 y.o. male presenting with upper extremity pain.   History provided by:  Patient  Upper Extremity Issue   Location:  Finger  Finger location:  L index finger  Injury: yes    Time since incident: Just prior to arrival.  Mechanism of injury comment:  Direct blow with a hammer  Pain details:     Quality:  Throbbing    Radiates to:  Does not radiate    Severity:  Moderate    Onset quality:  Sudden    Timing:  Constant    Progression:  Unchanged  Handedness:  Right-handed  Prior injury to area:  No  Relieved by:  Nothing  Worsened by:  Nothing  Ineffective treatments:  None tried  Associated symptoms: no back pain and no fever        Review of Systems   Constitutional: Negative for chills and fever.   HENT: Negative for congestion, ear pain, nosebleeds, rhinorrhea and sore throat.    Eyes: Negative for pain, discharge and visual disturbance.   Respiratory: Negative for shortness of breath and wheezing.    Cardiovascular: Negative for chest pain, palpitations and leg swelling.   Gastrointestinal: Negative for abdominal pain, blood in stool, diarrhea, nausea and vomiting.   Endocrine: Negative.    Genitourinary: Negative for dysuria, hematuria and urgency.   Musculoskeletal: Positive for arthralgias (left index finger). Negative for back pain.   Skin: Negative for pallor, rash and wound.   Allergic/Immunologic: Negative for immunocompromised state.   Neurological: Negative for dizziness, speech difficulty, weakness and headaches.   Hematological: Negative for adenopathy. Does not bruise/bleed easily.   Psychiatric/Behavioral: Negative.        Past Medical History:   Diagnosis Date   • Anxiety    • Depression    • Hypertension    • Neck injury        Allergies    Allergen Reactions   • Mustard [Allyl Isothiocyanate]    • Ultram [Tramadol Hcl]    • Amoxicillin Rash       Past Surgical History:   Procedure Laterality Date   • APPENDECTOMY N/A 1/10/2017    Procedure: APPENDECTOMY LAPAROSCOPIC;  Surgeon: Nicholas Gifford MD;  Location: [facility];  Service:    • BACK SURGERY     • KNEE ACL RECONSTRUCTION     • NO PAST SURGERIES         Family History   Problem Relation Age of Onset   • Diabetes Father    • Heart disease Father    • Heart disease Paternal Grandfather        Social History     Social History   • Marital status: Single     Spouse name: N/A   • Number of children: N/A   • Years of education: N/A     Social History Main Topics   • Smoking status: Never Smoker   • Smokeless tobacco: None   • Alcohol use No   • Drug use: No   • Sexual activity: Not Asked     Other Topics Concern   • None     Social History Narrative           Objective   Physical Exam   Constitutional: He is oriented to person, place, and time. He appears well-developed and well-nourished. No distress.   HENT:   Head: Normocephalic and atraumatic.   Nose: Nose normal.   Mouth/Throat: Oropharynx is clear and moist.   Eyes: EOM are normal. Pupils are equal, round, and reactive to light. Left eye exhibits no discharge. No scleral icterus.   Neck: Normal range of motion. Neck supple.   Cardiovascular: Normal rate, regular rhythm, normal heart sounds and intact distal pulses.    No murmur heard.  Pulmonary/Chest: Effort normal and breath sounds normal. No respiratory distress. He has no wheezes. He has no rales. He exhibits no tenderness.   Abdominal: Soft. Bowel sounds are normal. There is no tenderness.   Musculoskeletal: Normal range of motion. He exhibits no edema.   Eft index finger is without laceration. It is slight swelling in the distal phalanx.  No subungual hematoma.  The nail appears intact.  Normal flexion-extension with some pain on movement.  Normal cap refill.  Normal sensation.    Neurological: He is alert and oriented to person, place, and time.   Skin: Skin is warm and dry. No rash noted. He is not diaphoretic.   Psychiatric: He has a normal mood and affect.   Nursing note and vitals reviewed.      Procedures         ED Course  ED Course    The left index finger has no laceration or abrasions.  The nail is intact with no subungual hematoma.  Normal tendon function and normal sensation.  X-rays show no fracture.  A 4 prong splint was applied for comfort.  The patient will be discharged home to use an ice bag and take Tylenol or Motrin.              MDM    Final diagnoses:   Contusion of left index finger without damage to nail, initial encounter            STORMY Jalloh  04/05/17 7601

## 2017-04-05 NOTE — DISCHARGE INSTRUCTIONS
Apply an ice pack as needed.  Tylenol or Motrin for pain.  Wear the finger splint as needed for comfort.  Follow-up with your PCP if they have any ongoing concerns.

## 2017-04-30 ENCOUNTER — HOSPITAL ENCOUNTER (EMERGENCY)
Facility: HOSPITAL | Age: 37
Discharge: HOME | End: 2017-04-30
Attending: EMERGENCY MEDICINE | Admitting: EMERGENCY MEDICINE

## 2017-04-30 ENCOUNTER — APPOINTMENT (OUTPATIENT)
Dept: GENERAL RADIOLOGY | Facility: HOSPITAL | Age: 37
End: 2017-04-30

## 2017-04-30 VITALS
TEMPERATURE: 98.6 F | HEIGHT: 67 IN | WEIGHT: 215 LBS | BODY MASS INDEX: 33.74 KG/M2 | HEART RATE: 99 BPM | SYSTOLIC BLOOD PRESSURE: 145 MMHG | DIASTOLIC BLOOD PRESSURE: 89 MMHG | RESPIRATION RATE: 16 BRPM | OXYGEN SATURATION: 99 %

## 2017-04-30 DIAGNOSIS — S16.1XXA CERVICAL STRAIN, ACUTE, INITIAL ENCOUNTER: ICD-10-CM

## 2017-04-30 DIAGNOSIS — S29.019A STRAIN OF THORACIC SPINE, INITIAL ENCOUNTER: ICD-10-CM

## 2017-04-30 DIAGNOSIS — S96.911A RIGHT FOOT STRAIN, INITIAL ENCOUNTER: Primary | ICD-10-CM

## 2017-04-30 PROCEDURE — 99283 EMERGENCY DEPT VISIT LOW MDM: CPT

## 2017-04-30 PROCEDURE — 73630 X-RAY EXAM OF FOOT: CPT

## 2017-04-30 PROCEDURE — 72040 X-RAY EXAM NECK SPINE 2-3 VW: CPT

## 2017-04-30 PROCEDURE — 72072 X-RAY EXAM THORAC SPINE 3VWS: CPT

## 2017-04-30 RX ORDER — VALSARTAN AND HYDROCHLOROTHIAZIDE 160; 25 MG/1; MG/1
1 TABLET ORAL 2 TIMES DAILY
COMMUNITY
End: 2019-09-17 | Stop reason: SDUPTHER

## 2017-04-30 RX ORDER — HYDROCODONE BITARTRATE AND ACETAMINOPHEN 5; 325 MG/1; MG/1
1 TABLET ORAL EVERY 6 HOURS PRN
Qty: 15 TABLET | Refills: 0 | OUTPATIENT
Start: 2017-04-30 | End: 2018-11-14 | Stop reason: HOSPADM

## 2017-04-30 RX ORDER — HYDROCODONE BITARTRATE AND ACETAMINOPHEN 5; 325 MG/1; MG/1
1 TABLET ORAL ONCE
Status: COMPLETED | OUTPATIENT
Start: 2017-04-30 | End: 2017-04-30

## 2017-04-30 RX ADMIN — HYDROCODONE BITARTRATE AND ACETAMINOPHEN 1 TABLET: 5; 325 TABLET ORAL at 09:49

## 2017-05-09 ENCOUNTER — APPOINTMENT (OUTPATIENT)
Dept: GENERAL RADIOLOGY | Facility: HOSPITAL | Age: 37
End: 2017-05-09

## 2017-05-09 ENCOUNTER — HOSPITAL ENCOUNTER (EMERGENCY)
Facility: HOSPITAL | Age: 37
Discharge: HOME | End: 2017-05-09
Attending: EMERGENCY MEDICINE | Admitting: EMERGENCY MEDICINE

## 2017-05-09 VITALS
BODY MASS INDEX: 34.53 KG/M2 | TEMPERATURE: 98.3 F | DIASTOLIC BLOOD PRESSURE: 96 MMHG | HEART RATE: 116 BPM | RESPIRATION RATE: 14 BRPM | HEIGHT: 67 IN | SYSTOLIC BLOOD PRESSURE: 121 MMHG | WEIGHT: 220 LBS | OXYGEN SATURATION: 96 %

## 2017-05-09 DIAGNOSIS — S93.401A SPRAIN OF RIGHT ANKLE, UNSPECIFIED LIGAMENT, INITIAL ENCOUNTER: Primary | ICD-10-CM

## 2017-05-09 PROCEDURE — 73610 X-RAY EXAM OF ANKLE: CPT

## 2017-05-09 PROCEDURE — 99283 EMERGENCY DEPT VISIT LOW MDM: CPT

## 2017-05-09 RX ORDER — IBUPROFEN 400 MG/1
800 TABLET ORAL ONCE
Status: DISCONTINUED | OUTPATIENT
Start: 2017-05-09 | End: 2017-05-09

## 2017-05-09 RX ORDER — IBUPROFEN 800 MG/1
800 TABLET ORAL EVERY 8 HOURS PRN
Qty: 30 TABLET | Refills: 0 | OUTPATIENT
Start: 2017-05-09 | End: 2018-11-14

## 2017-07-10 ENCOUNTER — APPOINTMENT (OUTPATIENT)
Dept: GENERAL RADIOLOGY | Facility: HOSPITAL | Age: 37
End: 2017-07-10

## 2017-07-10 ENCOUNTER — HOSPITAL ENCOUNTER (EMERGENCY)
Facility: HOSPITAL | Age: 37
Discharge: HOME | End: 2017-07-10
Attending: EMERGENCY MEDICINE | Admitting: EMERGENCY MEDICINE

## 2017-07-10 VITALS
RESPIRATION RATE: 12 BRPM | DIASTOLIC BLOOD PRESSURE: 84 MMHG | HEIGHT: 67 IN | HEART RATE: 65 BPM | SYSTOLIC BLOOD PRESSURE: 148 MMHG | OXYGEN SATURATION: 99 % | WEIGHT: 210 LBS | BODY MASS INDEX: 32.96 KG/M2 | TEMPERATURE: 97.9 F

## 2017-07-10 DIAGNOSIS — S63.501A RIGHT WRIST SPRAIN, INITIAL ENCOUNTER: Primary | ICD-10-CM
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
## 2017-07-10 PROCEDURE — 73110 X-RAY EXAM OF WRIST: CPT

## 2017-07-10 PROCEDURE — 99283 EMERGENCY DEPT VISIT LOW MDM: CPT

## 2017-07-10 RX ORDER — OXYCODONE AND ACETAMINOPHEN 7.5; 325 MG/1; MG/1
1 TABLET ORAL ONCE
Status: COMPLETED | OUTPATIENT
Start: 2017-07-10 | End: 2017-07-10

## 2017-07-10 RX ORDER — NAPROXEN 500 MG/1
500 TABLET ORAL 2 TIMES DAILY PRN
Qty: 20 TABLET | Refills: 0 | OUTPATIENT
Start: 2017-07-10 | End: 2018-11-14 | Stop reason: HOSPADM

## 2017-07-10 RX ADMIN — OXYCODONE HYDROCHLORIDE AND ACETAMINOPHEN 1 TABLET: 7.5; 325 TABLET ORAL at 08:33

## 2017-07-10 NOTE — DISCHARGE INSTRUCTIONS
Wear velcro wrist splint as needed.  Elevate and apply ice bag off/on.  Naproxen as prescribed for pain.  Call Dr. Yang for follow up.

## 2017-07-10 NOTE — ED PROVIDER NOTES
Subjective   HPI Comments: 36-year-old male presents to the emergency department with right wrist pain after a fall.  The patient states that he was walking his dog on a leash and dog got away from him area and he took off running and tripped over the sidewalk.  He fell on an outstretched right hand.  He complains of pain and swelling of the right wrist, primarily at the region of the snuffbox.  He is right-hand dominant.  He denies any other injury.  Past medical history of hypertension, depression and anxiety.  He is a nonsmoker.  No alcohol or drug use.  He has no PCP.    Patient is a 36 y.o. male presenting with wrist injury.   History provided by:  Patient  Wrist Injury   Location:  Wrist  Wrist location:  R wrist  Injury: yes    Mechanism of injury: fall    Associated symptoms: no back pain and no fever        Review of Systems   Constitutional: Negative for chills and fever.   HENT: Negative for congestion, ear pain, nosebleeds, rhinorrhea and sore throat.    Eyes: Negative for pain, discharge and visual disturbance.   Respiratory: Negative for shortness of breath and wheezing.    Cardiovascular: Negative for chest pain, palpitations and leg swelling.   Gastrointestinal: Negative for abdominal pain, blood in stool, diarrhea, nausea and vomiting.   Endocrine: Negative.    Genitourinary: Negative for dysuria, hematuria and urgency.   Musculoskeletal: Positive for arthralgias (right wrist) and joint swelling (right wrist). Negative for back pain.   Skin: Negative for pallor, rash and wound.   Allergic/Immunologic: Negative for immunocompromised state.   Neurological: Negative for dizziness, speech difficulty, weakness and headaches.   Hematological: Negative for adenopathy. Does not bruise/bleed easily.   Psychiatric/Behavioral: Negative.        Past Medical History:   Diagnosis Date   • Anxiety    • Depression    • Hypertension    • Neck injury        Allergies   Allergen Reactions   • Mustard [Allyl  Isothiocyanate]    • Ultram [Tramadol Hcl]      SEIZURE IN PAST   • Amoxicillin Rash       Past Surgical History:   Procedure Laterality Date   • APPENDECTOMY N/A 1/10/2017    Procedure: APPENDECTOMY LAPAROSCOPIC;  Surgeon: Nicholas Gifford MD;  Location: [facility];  Service:    • BACK SURGERY     • KNEE ACL RECONSTRUCTION     • NO PAST SURGERIES         Family History   Problem Relation Age of Onset   • Diabetes Father    • Heart disease Father    • Heart disease Paternal Grandfather        Social History     Social History   • Marital status: Single     Spouse name: N/A   • Number of children: N/A   • Years of education: N/A     Social History Main Topics   • Smoking status: Never Smoker   • Smokeless tobacco: None   • Alcohol use No   • Drug use: No   • Sexual activity: Not Asked     Other Topics Concern   • None     Social History Narrative           Objective   Physical Exam   Constitutional: He is oriented to person, place, and time. He appears well-developed and well-nourished. No distress.   HENT:   Head: Normocephalic and atraumatic.   Nose: Nose normal.   Mouth/Throat: Oropharynx is clear and moist.   Eyes: EOM are normal. Pupils are equal, round, and reactive to light. No scleral icterus.   Neck: Normal range of motion. Neck supple.   Cardiovascular: Normal rate, regular rhythm, normal heart sounds and intact distal pulses.    No murmur heard.  Pulmonary/Chest: Effort normal and breath sounds normal. No respiratory distress. He has no wheezes. He has no rales. He exhibits no tenderness.   Abdominal: Soft. Bowel sounds are normal. There is no tenderness.   Musculoskeletal: Normal range of motion. He exhibits tenderness (moderate tenderness and swelling to the right distal forearm and wrist.  The tenderness is maximal at the anatomical snuffbox and right radial region.). He exhibits no edema.   Neurological: He is alert and oriented to person, place, and time.   Skin: Skin is warm and dry. No rash noted.  He is not diaphoretic.   Psychiatric: He has a normal mood and affect.   Nursing note and vitals reviewed.      Procedures         ED Course  ED Course    10:22 AM  Right wrist x-ray is read as negative radiology.  The patient will be placed in a Velcro wrist splint.  I have advised him to elevate the arm as often as possible and use an ice bag off and on.  He will be referred to orthopedics for follow-up if his wrist pain persists.              MDM    Final diagnoses:   Right wrist sprain, initial encounter   Fall, initial encounter            STORMY Jalloh  07/10/17 1022

## 2017-12-26 ENCOUNTER — HOSPITAL ENCOUNTER (EMERGENCY)
Facility: HOSPITAL | Age: 37
Discharge: HOME | End: 2017-12-26
Attending: EMERGENCY MEDICINE | Admitting: EMERGENCY MEDICINE

## 2017-12-26 VITALS
TEMPERATURE: 98.6 F | OXYGEN SATURATION: 97 % | DIASTOLIC BLOOD PRESSURE: 76 MMHG | HEART RATE: 93 BPM | SYSTOLIC BLOOD PRESSURE: 145 MMHG | WEIGHT: 210 LBS | HEIGHT: 67 IN | RESPIRATION RATE: 16 BRPM | BODY MASS INDEX: 32.96 KG/M2

## 2017-12-26 DIAGNOSIS — H10.33 ACUTE BACTERIAL CONJUNCTIVITIS OF BOTH EYES: Primary | ICD-10-CM

## 2017-12-26 PROCEDURE — 99282 EMERGENCY DEPT VISIT SF MDM: CPT

## 2017-12-26 RX ORDER — CIPROFLOXACIN HYDROCHLORIDE 3.5 MG/ML
1 SOLUTION/ DROPS TOPICAL EVERY 4 HOURS
Qty: 10 ML | Refills: 0 | Status: SHIPPED | OUTPATIENT
Start: 2017-12-26 | End: 2018-01-02

## 2018-11-13 ENCOUNTER — APPOINTMENT (OUTPATIENT)
Dept: GENERAL RADIOLOGY | Facility: HOSPITAL | Age: 38
End: 2018-11-13

## 2018-11-13 ENCOUNTER — HOSPITAL ENCOUNTER (EMERGENCY)
Facility: HOSPITAL | Age: 38
Discharge: HOME | End: 2018-11-14
Attending: EMERGENCY MEDICINE | Admitting: NURSE PRACTITIONER

## 2018-11-13 ENCOUNTER — APPOINTMENT (OUTPATIENT)
Dept: CT IMAGING | Facility: HOSPITAL | Age: 38
End: 2018-11-13
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
## 2018-11-13 DIAGNOSIS — S20.229A CONTUSION OF BACK, UNSPECIFIED LATERALITY, INITIAL ENCOUNTER: ICD-10-CM

## 2018-11-13 DIAGNOSIS — S09.90XA INJURY OF HEAD, INITIAL ENCOUNTER: ICD-10-CM

## 2018-11-13 DIAGNOSIS — M25.532 LEFT WRIST PAIN: ICD-10-CM

## 2018-11-13 PROCEDURE — 73130 X-RAY EXAM OF HAND: CPT

## 2018-11-13 PROCEDURE — 70450 CT HEAD/BRAIN W/O DYE: CPT

## 2018-11-13 PROCEDURE — 72125 CT NECK SPINE W/O DYE: CPT

## 2018-11-13 PROCEDURE — 72128 CT CHEST SPINE W/O DYE: CPT

## 2018-11-13 PROCEDURE — 73090 X-RAY EXAM OF FOREARM: CPT

## 2018-11-13 PROCEDURE — 99284 EMERGENCY DEPT VISIT MOD MDM: CPT

## 2018-11-13 RX ORDER — HYDROCODONE BITARTRATE AND ACETAMINOPHEN 5; 325 MG/1; MG/1
1 TABLET ORAL ONCE
Status: COMPLETED | OUTPATIENT
Start: 2018-11-13 | End: 2018-11-13

## 2018-11-13 RX ADMIN — HYDROCODONE BITARTATE AND ACETAMINOPHEN 1 TABLET: 5; 325 TABLET ORAL at 23:21

## 2018-11-14 VITALS
DIASTOLIC BLOOD PRESSURE: 103 MMHG | RESPIRATION RATE: 16 BRPM | TEMPERATURE: 98 F | HEART RATE: 104 BPM | WEIGHT: 210 LBS | BODY MASS INDEX: 32.96 KG/M2 | SYSTOLIC BLOOD PRESSURE: 129 MMHG | HEIGHT: 67 IN | OXYGEN SATURATION: 98 %

## 2018-11-14 RX ORDER — IBUPROFEN 800 MG/1
800 TABLET ORAL
Qty: 15 TABLET | Refills: 0 | OUTPATIENT
Start: 2018-11-14 | End: 2019-06-18

## 2018-11-14 RX ORDER — HYDROCODONE BITARTRATE AND ACETAMINOPHEN 5; 325 MG/1; MG/1
1 TABLET ORAL ONCE
Status: COMPLETED | OUTPATIENT
Start: 2018-11-14 | End: 2018-11-14

## 2018-11-14 RX ADMIN — HYDROCODONE BITARTRATE AND ACETAMINOPHEN 1 TABLET: 5; 325 TABLET ORAL at 00:08

## 2018-11-14 NOTE — ED PROVIDER NOTES
Subjective   Lavelle Cabral is a 37 yr old white male that presents to the ER with c/o head pain and lt wrist pain following a fall.  Pt explains that he was walking down the isle at a local store when he slipped on detergent. Pt explains that he slipped and fell backwards hitting his head on the tile toan. Pt denies any LOC, Pt explains that he felt dazed after the fall. Pt c/o neck pain and upper back pain. Pt has a knot to his posterior scalp. Pt c/o pain and swelling to his lt wrist. Pt has a deformity to the Lt wrist.  Pt denies any nausea, vomiting. Pt denies any visual disturbance. Pt was able to get up off the ground.  Patient is able to ambulate without difficulty.  Pt is tearful at this time.        History provided by:  Patient  Fall   Mechanism of injury: fall    Injury location:  Head/neck (neck, upper back, and lt wrist)  Head/neck injury location:  Head  Incident location: store.  Time since incident: PTA.  Arrived directly from scene: no    Fall:     Fall occurred:  Walking    Impact surface:  Hard floor    Point of impact:  Head, back and neck  Suspicion of alcohol use: no    Suspicion of drug use: no    Prior to arrival data:     Patient ambulatory at scene: yes      Responsiveness at scene:  Alert    Orientation at scene:  Person, place, situation and time    Loss of consciousness: no      IV access status:  None  Associated symptoms: back pain, headaches and neck pain    Associated symptoms: no abdominal pain, no chest pain, no loss of consciousness, no nausea and no vomiting    Risk factors: no anticoagulation therapy        Review of Systems   Cardiovascular: Negative for chest pain.   Gastrointestinal: Negative for abdominal pain, nausea and vomiting.   Musculoskeletal: Positive for back pain and neck pain.   Neurological: Positive for headaches. Negative for loss of consciousness.       Past Medical History:   Diagnosis Date   • Anxiety    • Depression    • Hypertension    • Neck injury         Allergies   Allergen Reactions   • Mustard [Allyl Isothiocyanate]    • Ultram [Tramadol Hcl]      SEIZURE IN PAST   • Amoxicillin Rash       Past Surgical History:   Procedure Laterality Date   • APPENDECTOMY     • BACK SURGERY     • KNEE ACL RECONSTRUCTION         Family History   Problem Relation Age of Onset   • Diabetes Father    • Heart disease Father    • Heart disease Paternal Grandfather        Social History     Socioeconomic History   • Marital status: Single     Spouse name: Not on file   • Number of children: Not on file   • Years of education: Not on file   • Highest education level: Not on file   Tobacco Use   • Smoking status: Never Smoker   • Smokeless tobacco: Never Used   Substance and Sexual Activity   • Alcohol use: No   • Drug use: No   • Sexual activity: Defer           Objective   Physical Exam   Constitutional: He is oriented to person, place, and time. He appears well-developed and well-nourished. He is cooperative.  Non-toxic appearance. He appears distressed (pt is crying in discomfort at this time.).   HENT:   Head: Normocephalic. Head is with contusion (posterior scalp).   Nose: Nose normal.   Mouth/Throat: Oropharynx is clear and moist.   Eyes: Conjunctivae, EOM and lids are normal. Pupils are equal, round, and reactive to light.   Neck: Trachea normal and normal range of motion. Muscular tenderness present. No spinous process tenderness present. Normal range of motion present.   Cardiovascular: Regular rhythm, normal heart sounds, intact distal pulses and normal pulses.   Pulmonary/Chest: Effort normal and breath sounds normal. No respiratory distress. He has no decreased breath sounds. He has no wheezes. He has no rhonchi. He has no rales.   Abdominal: Soft. Normal appearance and bowel sounds are normal. There is no tenderness.   Musculoskeletal:        Left wrist: He exhibits decreased range of motion, tenderness (lt wrist tenderness, +snuffbox tenderness.  Pain radiates into  lt forearm.) and swelling.        Cervical back: He exhibits tenderness (paraspinal tenderness on exam. ). He exhibits normal range of motion and no bony tenderness.        Thoracic back: Normal. He exhibits normal range of motion, no tenderness and no bony tenderness.        Lumbar back: Normal. He exhibits normal range of motion, no tenderness and no bony tenderness.   Neurological: He is alert and oriented to person, place, and time. He has normal strength. No cranial nerve deficit.   Skin: Skin is warm, dry and intact. No rash noted.   Psychiatric: He has a normal mood and affect. His speech is normal and behavior is normal.   Nursing note and vitals reviewed.      Procedures           ED Course  ED Course as of Nov 18 0135   Wed Nov 14, 2018   0053 Although the x-ray was negative.  Patient had positive snuffbox tenderness.  Patient's left upper extremity will place a thumb spica.  Patient advises he needs to follow-up with Orth [facility].  Patient advises he needs a repeat x-ray on extremity.  Patient will be discharged home.  Patient agrees and verbalizes understanding.  [KG]      ED Course User Index  [KG] Zuleima Fish, VIOLETTA        No results found for this or any previous visit (from the past 24 hour(s)).  Note: In addition to lab results from this visit, the labs listed above may include labs taken at another facility or during a different encounter within the last 24 hours. Please correlate lab times with ED admission and discharge times for further clarification of the services performed during this visit.    CT Cervical Spine Without Contrast   Final Result      No acute cervical spine abnormality demonstrated.      Reversal of the normal cervical lordosis possibly positional and/or    degenerative; however, muscle spasm from soft tissue injury not excluded.        Degenerative changes most evident C5-C6 which appear progressed since previous    plain film study 04/30/2017.         THIS DOCUMENT HAS BEEN  ELECTRONICALLY SIGNED BY LATRELL GAN MD      CT Head Without Contrast   Final Result      No acute intracranial abnormality.       THIS DOCUMENT HAS BEEN ELECTRONICALLY SIGNED BY LATRELL GAN MD      CT Thoracic Spine Without Contrast   Final Result      No acute thoracic spine abnormality demonstrated.      Additional nonacute findings as noted.      THIS DOCUMENT HAS BEEN ELECTRONICALLY SIGNED BY LATRELL GAN MD      XR Forearm 2 View Left   Final Result      No acute bone abnormality.      Soft tissue abnormalities as noted above.      THIS DOCUMENT HAS BEEN ELECTRONICALLY SIGNED BY LATRELL GAN MD      XR Hand 3+ View Left   Final Result      No acute bone abnormality.      Soft tissue abnormality as discussed above.      THIS DOCUMENT HAS BEEN ELECTRONICALLY SIGNED BY LATRELL GAN MD        Vitals:    11/14/18 0015 11/14/18 0030 11/14/18 0045 11/14/18 0109   BP: 130/92 (!) 122/101 (!) 129/103    BP Location:       Patient Position:       Pulse:       Resp:       Temp:       TempSrc:       SpO2: 94% 94% 92% 98%   Weight:       Height:         Medications   HYDROcodone-acetaminophen (NORCO) 5-325 MG per tablet 1 tablet (1 tablet Oral Given 11/13/18 2321)   HYDROcodone-acetaminophen (NORCO) 5-325 MG per tablet 1 tablet (1 tablet Oral Given 11/14/18 0008)     ECG/EMG Results (last 24 hours)     ** No results found for the last 24 hours. **                  [facility]      Final diagnoses:   Fall, initial encounter   Injury of head, initial encounter   Contusion of back, unspecified laterality, initial encounter   Left wrist pain            Zuleima Fish, APRN  11/18/18 0135

## 2018-12-21 ENCOUNTER — HOSPITAL ENCOUNTER (EMERGENCY)
Facility: HOSPITAL | Age: 38
Discharge: HOME | End: 2018-12-21
Attending: EMERGENCY MEDICINE | Admitting: NURSE PRACTITIONER

## 2018-12-21 ENCOUNTER — APPOINTMENT (OUTPATIENT)
Dept: GENERAL RADIOLOGY | Facility: HOSPITAL | Age: 38
End: 2018-12-21

## 2018-12-21 VITALS
WEIGHT: 220 LBS | RESPIRATION RATE: 18 BRPM | DIASTOLIC BLOOD PRESSURE: 105 MMHG | OXYGEN SATURATION: 94 % | HEART RATE: 94 BPM | HEIGHT: 67 IN | SYSTOLIC BLOOD PRESSURE: 155 MMHG | BODY MASS INDEX: 34.53 KG/M2 | TEMPERATURE: 99.4 F

## 2018-12-21 DIAGNOSIS — R50.9 FEVER IN ADULT: ICD-10-CM

## 2018-12-21 DIAGNOSIS — M25.472 SWELLING OF BOTH ANKLES: ICD-10-CM

## 2018-12-21 DIAGNOSIS — J18.9 PNEUMONIA DUE TO INFECTIOUS ORGANISM, UNSPECIFIED LATERALITY, UNSPECIFIED PART OF LUNG: Primary | ICD-10-CM

## 2018-12-21 DIAGNOSIS — M25.471 SWELLING OF BOTH ANKLES: ICD-10-CM

## 2018-12-21 PROCEDURE — 94640 AIRWAY INHALATION TREATMENT: CPT

## 2018-12-21 PROCEDURE — 99284 EMERGENCY DEPT VISIT MOD MDM: CPT

## 2018-12-21 PROCEDURE — 93005 ELECTROCARDIOGRAM TRACING: CPT

## 2018-12-21 PROCEDURE — 71045 X-RAY EXAM CHEST 1 VIEW: CPT

## 2018-12-21 RX ORDER — ASPIRIN 81 MG/1
81 TABLET, CHEWABLE ORAL DAILY
COMMUNITY
End: 2019-06-24

## 2018-12-21 RX ORDER — ALBUTEROL SULFATE 90 UG/1
2 AEROSOL, METERED RESPIRATORY (INHALATION) EVERY 4 HOURS PRN
Qty: 1 INHALER | Refills: 0 | Status: SHIPPED | OUTPATIENT
Start: 2018-12-21 | End: 2019-09-17

## 2018-12-21 RX ORDER — GUAIFENESIN DEXTROMETHORPHAN HYDROBROMIDE ORAL SOLUTION 10; 100 MG/5ML; MG/5ML
5 SOLUTION ORAL EVERY 12 HOURS
Qty: 120 ML | Refills: 0 | Status: SHIPPED | OUTPATIENT
Start: 2018-12-21 | End: 2018-12-21 | Stop reason: SDUPTHER

## 2018-12-21 RX ORDER — GUAIFENESIN DEXTROMETHORPHAN HYDROBROMIDE ORAL SOLUTION 10; 100 MG/5ML; MG/5ML
5 SOLUTION ORAL EVERY 12 HOURS
Qty: 120 ML | Refills: 0 | Status: SHIPPED | OUTPATIENT
Start: 2018-12-21 | End: 2019-06-24

## 2018-12-21 RX ORDER — ALBUTEROL SULFATE 2.5 MG/3ML
2.5 SOLUTION RESPIRATORY (INHALATION) ONCE
Status: COMPLETED | OUTPATIENT
Start: 2018-12-21 | End: 2018-12-21

## 2018-12-21 RX ORDER — ALBUTEROL SULFATE 90 UG/1
2 AEROSOL, METERED RESPIRATORY (INHALATION) EVERY 4 HOURS PRN
Qty: 1 INHALER | Refills: 0 | Status: SHIPPED | OUTPATIENT
Start: 2018-12-21 | End: 2018-12-21 | Stop reason: SDUPTHER

## 2018-12-21 RX ORDER — DOXYCYCLINE 100 MG/1
100 CAPSULE ORAL 2 TIMES DAILY
Qty: 20 CAPSULE | Refills: 0 | OUTPATIENT
Start: 2018-12-21 | End: 2019-06-18

## 2018-12-21 RX ORDER — DOXYCYCLINE 100 MG/1
100 CAPSULE ORAL 2 TIMES DAILY
Qty: 20 CAPSULE | Refills: 0 | Status: SHIPPED | OUTPATIENT
Start: 2018-12-21 | End: 2018-12-21 | Stop reason: SDUPTHER

## 2018-12-21 RX ORDER — SODIUM CHLORIDE 0.9 % (FLUSH) 0.9 %
10 SYRINGE (ML) INJECTION AS NEEDED
Status: DISCONTINUED | OUTPATIENT
Start: 2018-12-21 | End: 2018-12-21 | Stop reason: HOSPADM

## 2018-12-21 RX ADMIN — ALBUTEROL SULFATE 2.5 MG: 2.5 SOLUTION RESPIRATORY (INHALATION) at 09:46

## 2018-12-21 NOTE — ED PROVIDER NOTES
Subjective   Patient is a 38-year-old male who presents with productive cough with yellow sputum and congestion for approximately 2 weeks.  Was seen at the [facility] and was given a steroid and inhaler which patient states is not improving his cough.  There is mild to moderate nasal congestion as well as rhinorrhea present.  Patient denies fever, chills, headache.         History provided by:  Patient  Cough   Cough characteristics:  Productive  Sputum characteristics:  Yellow  Severity:  Moderate  Onset quality:  Sudden  Duration:  2 weeks  Timing:  Intermittent  Progression:  Unchanged  Chronicity:  New  Smoker: no    Context: upper respiratory infection    Relieved by:  Nothing  Worsened by:  Lying down  Ineffective treatments:  Beta-agonist inhaler (steroid)  Associated symptoms: rhinorrhea    Associated symptoms: no chest pain, no chills, no ear pain, no fever, no headaches, no myalgias, no shortness of breath, no sore throat and no wheezing        Review of Systems   Constitutional: Negative for chills and fever.   HENT: Positive for rhinorrhea. Negative for ear pain and sore throat.    Respiratory: Positive for cough. Negative for shortness of breath and wheezing.         Denies pain with deep breathing   Cardiovascular: Positive for leg swelling (bilateral ankle-mild). Negative for chest pain.   Musculoskeletal: Negative for myalgias.   Neurological: Negative for dizziness, light-headedness and headaches.   All other systems reviewed and are negative.      Past Medical History:   Diagnosis Date   • Anxiety    • Depression    • Hypertension    • Neck injury        Allergies   Allergen Reactions   • Ciprofloxacin Swelling   • Mustard [Allyl Isothiocyanate]    • Ultram [Tramadol Hcl]      SEIZURE IN PAST   • Amoxicillin Rash       Past Surgical History:   Procedure Laterality Date   • APPENDECTOMY N/A 1/10/2017    Procedure: APPENDECTOMY LAPAROSCOPIC;  Surgeon: Nicholas Gifford MD;  Location: [facility] OR;   Service:    • APPENDECTOMY     • KNEE ACL RECONSTRUCTION         Family History   Problem Relation Age of Onset   • Diabetes Father    • Heart disease Father    • Heart disease Paternal Grandfather        Social History     Socioeconomic History   • Marital status: Single     Spouse name: Not on file   • Number of children: Not on file   • Years of education: Not on file   • Highest education level: Not on file   Tobacco Use   • Smoking status: Never Smoker   • Smokeless tobacco: Never Used   Substance and Sexual Activity   • Alcohol use: No   • Drug use: No   • Sexual activity: Defer           Objective   Physical Exam   Constitutional: He is oriented to person, place, and time. He appears well-developed and well-nourished.   HENT:   Head: Normocephalic.   Mouth/Throat: Oropharynx is clear and moist. No oropharyngeal exudate or posterior oropharyngeal edema.   Neck: Normal range of motion. Neck supple. No JVD present.   Cardiovascular: Normal rate, regular rhythm, normal heart sounds and intact distal pulses.   Pulses:       Dorsalis pedis pulses are 2+ on the right side, and 2+ on the left side.   Pulmonary/Chest: Effort normal and breath sounds normal. He has no wheezes. He has no rhonchi. He has no rales.   Abdominal: Soft. Bowel sounds are normal. There is no tenderness.   Musculoskeletal:        Right lower leg: Normal.        Left lower leg: Normal.   Lymphadenopathy:     He has no cervical adenopathy.   Neurological: He is alert and oriented to person, place, and time.   Skin: Skin is warm and dry. Capillary refill takes less than 2 seconds.   Psychiatric: His speech is normal and behavior is normal. His mood appears anxious.   Vitals reviewed.      Procedures           ED Course      No results found for this or any previous visit (from the past 24 hour(s)).  Note: In addition to lab results from this visit, the labs listed above may include labs taken at another facility or during a different encounter  within the last 24 hours. Please correlate lab times with ED admission and discharge times for further clarification of the services performed during this visit.    XR Chest 1 View   Final Result   Normal chest x-ray.       D:  12/21/2018   E:  12/21/2018       This report was finalized on 12/21/2018 2:14 PM by Dr. Vick Gaspar MD.            Vitals:    12/21/18 0915 12/21/18 0929 12/21/18 0945 12/21/18 0946   BP:       Patient Position:       Pulse: 105 108 100 94   Resp:    18   Temp:       TempSrc:       SpO2:  94% 94% 94%   Weight:       Height:         Medications   albuterol (PROVENTIL) nebulizer solution 0.083% 2.5 mg/3mL (2.5 mg Nebulization Given 12/21/18 0946)     ECG/EMG Results (last 24 hours)     ** No results found for the last 24 hours. **                    [facility]      Final diagnoses:   Pneumonia due to infectious organism, unspecified laterality, unspecified part of lung   Fever in adult   Swelling of both ankles            Danyelle Hutton, APRN  12/28/18 3650

## 2018-12-29 NOTE — ED PROVIDER NOTES
Subjective   Patient is a 38-year-old male who presents with productive cough and yellow sputum and congestion for approximately 2 weeks.  Was seen at the [facility] and was given a steroid and an inhaler which patient states is not improving his cough.  There is mild to moderate nasal congestion as well as rhinorrhea.  Patient denies fever, chills, headache        Cough   Cough characteristics:  Productive  Sputum characteristics:  Yellow  Severity:  Moderate  Onset quality:  Sudden  Duration:  2 weeks  Timing:  Constant  Progression:  Unchanged  Chronicity:  New  Relieved by:  Nothing  Worsened by:  Nothing  Ineffective treatments:  Beta-agonist inhaler  Associated symptoms: rhinorrhea and sore throat    Associated symptoms: no chest pain, no chills, no fever and no shortness of breath        Review of Systems   Constitutional: Negative for chills and fever.   HENT: Positive for rhinorrhea and sore throat.    Respiratory: Positive for cough. Negative for shortness of breath.    Cardiovascular: Negative for chest pain.   Gastrointestinal: Negative for diarrhea, nausea and vomiting.   Neurological: Negative for dizziness and light-headedness.   All other systems reviewed and are negative.      Past Medical History:   Diagnosis Date   • Anxiety    • Depression    • Hypertension    • Neck injury        Allergies   Allergen Reactions   • Ciprofloxacin Swelling   • Mustard [Allyl Isothiocyanate]    • Ultram [Tramadol Hcl]      SEIZURE IN PAST   • Amoxicillin Rash       Past Surgical History:   Procedure Laterality Date   • APPENDECTOMY N/A 1/10/2017    Procedure: APPENDECTOMY LAPAROSCOPIC;  Surgeon: Nicholas Gifford MD;  Location: [facility];  Service:    • APPENDECTOMY     • KNEE ACL RECONSTRUCTION         Family History   Problem Relation Age of Onset   • Diabetes Father    • Heart disease Father    • Heart disease Paternal Grandfather        Social History     Socioeconomic History   • Marital status: Single      Spouse name: Not on file   • Number of children: Not on file   • Years of education: Not on file   • Highest education level: Not on file   Tobacco Use   • Smoking status: Never Smoker   • Smokeless tobacco: Never Used   Substance and Sexual Activity   • Alcohol use: No   • Drug use: No   • Sexual activity: Defer           Objective   Physical Exam   Constitutional: He is oriented to person, place, and time. He appears well-developed and well-nourished.   HENT:   Head: Normocephalic.   Neck: Neck supple.   Cardiovascular: Normal rate, regular rhythm, normal heart sounds and intact distal pulses.   Pulmonary/Chest: Effort normal and breath sounds normal. He has no decreased breath sounds. He has no wheezes. He has no rhonchi.   Lymphadenopathy:     He has no cervical adenopathy.   Neurological: He is alert and oriented to person, place, and time.   Skin: Skin is warm and dry.   Psychiatric: He has a normal mood and affect. His behavior is normal.   Vitals reviewed.      Procedures           ED Course      No results found for this or any previous visit (from the past 24 hour(s)).  Note: In addition to lab results from this visit, the labs listed above may include labs taken at another facility or during a different encounter within the last 24 hours. Please correlate lab times with ED admission and discharge times for further clarification of the services performed during this visit.    XR Chest 1 View   Final Result   Normal chest x-ray.       D:  12/21/2018   E:  12/21/2018       This report was finalized on 12/21/2018 2:14 PM by Dr. Vick Gaspar MD.            Vitals:    12/21/18 0915 12/21/18 0929 12/21/18 0945 12/21/18 0946   BP:       Patient Position:       Pulse: 105 108 100 94   Resp:    18   Temp:       TempSrc:       SpO2:  94% 94% 94%   Weight:       Height:         Medications   albuterol (PROVENTIL) nebulizer solution 0.083% 2.5 mg/3mL (2.5 mg Nebulization Given 12/21/18 0946)     ECG/EMG Results (last  24 hours)     ** No results found for the last 24 hours. **        Discussed lab findings an x-ray results with patient.  Advised patient to take medication exactly as prescribed and to finish entire course of therapy.  Advised to follow with primary care provider in 3-4 days or return to the ER with worsening symptoms or development of new symptoms.  Patient verbalized understanding of all discussed            MDM      Final diagnoses:   Pneumonia due to infectious organism, unspecified laterality, unspecified part of lung   Fever in adult   Swelling of both ankles            Danyelle Hutton, APRN  12/28/18 [id number]

## 2019-06-18 ENCOUNTER — HOSPITAL ENCOUNTER (EMERGENCY)
Facility: HOSPITAL | Age: 39
Discharge: HOME | End: 2019-06-18
Attending: EMERGENCY MEDICINE | Admitting: EMERGENCY MEDICINE

## 2019-06-18 ENCOUNTER — APPOINTMENT (OUTPATIENT)
Dept: GENERAL RADIOLOGY | Facility: HOSPITAL | Age: 39
End: 2019-06-18

## 2019-06-18 VITALS
SYSTOLIC BLOOD PRESSURE: 136 MMHG | WEIGHT: 220 LBS | TEMPERATURE: 98.3 F | HEIGHT: 67 IN | BODY MASS INDEX: 34.53 KG/M2 | OXYGEN SATURATION: 100 % | HEART RATE: 75 BPM | DIASTOLIC BLOOD PRESSURE: 93 MMHG | RESPIRATION RATE: 16 BRPM

## 2019-06-18 DIAGNOSIS — S39.012A LUMBAR STRAIN, INITIAL ENCOUNTER: Primary | ICD-10-CM

## 2019-06-18 DIAGNOSIS — S33.100A SUBLUXATION OF LUMBAR VERTEBRA, INITIAL ENCOUNTER: ICD-10-CM

## 2019-06-18 PROCEDURE — 99283 EMERGENCY DEPT VISIT LOW MDM: CPT

## 2019-06-18 PROCEDURE — 72100 X-RAY EXAM L-S SPINE 2/3 VWS: CPT

## 2019-06-18 RX ORDER — CYCLOBENZAPRINE HCL 10 MG
10 TABLET ORAL 3 TIMES DAILY PRN
Qty: 15 TABLET | Refills: 0 | Status: SHIPPED | OUTPATIENT
Start: 2019-06-18 | End: 2019-06-24

## 2019-06-18 RX ORDER — CYCLOBENZAPRINE HCL 10 MG
10 TABLET ORAL ONCE
Status: COMPLETED | OUTPATIENT
Start: 2019-06-18 | End: 2019-06-18

## 2019-06-18 RX ORDER — IBUPROFEN 800 MG/1
800 TABLET ORAL
Qty: 20 TABLET | Refills: 0 | Status: SHIPPED | OUTPATIENT
Start: 2019-06-18 | End: 2019-09-17

## 2019-06-18 RX ADMIN — CYCLOBENZAPRINE HYDROCHLORIDE 10 MG: 10 TABLET, FILM COATED ORAL at 15:46

## 2019-06-24 ENCOUNTER — OFFICE VISIT (OUTPATIENT)
Dept: NEUROSURGERY | Facility: CLINIC | Age: 39
End: 2019-06-24

## 2019-06-24 VITALS
SYSTOLIC BLOOD PRESSURE: 130 MMHG | TEMPERATURE: 98.7 F | HEIGHT: 67 IN | BODY MASS INDEX: 33.74 KG/M2 | WEIGHT: 215 LBS | DIASTOLIC BLOOD PRESSURE: 88 MMHG | RESPIRATION RATE: 17 BRPM

## 2019-06-24 DIAGNOSIS — G57.11 MERALGIA PARESTHETICA OF RIGHT SIDE: Primary | ICD-10-CM

## 2019-06-24 DIAGNOSIS — M54.41 ACUTE BILATERAL LOW BACK PAIN WITH RIGHT-SIDED SCIATICA: ICD-10-CM

## 2019-06-24 PROCEDURE — 99214 OFFICE O/P EST MOD 30 MIN: CPT | Performed by: PHYSICIAN ASSISTANT

## 2019-06-24 RX ORDER — METHYLPREDNISOLONE 4 MG/1
TABLET ORAL
Qty: 1 EACH | Refills: 0 | Status: SHIPPED | OUTPATIENT
Start: 2019-06-24 | End: 2019-09-17

## 2019-06-24 NOTE — PROGRESS NOTES
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  Patient went to [employer] and rode a wooden roller coaster the jarring around quite a bit about 2 weeks ago.  Patient states that after that ride he noticed that he had some back pain and right anterior thigh numbness.  Patient's symptoms continue to progress for about a week until he showed up at the emergency department.    Patient states that his low back pain is generally diffuse but when working in standing/walking for long periods of time he does have increased pain is in spasm tight nature in his low back.    Patient recently lost 30 pounds but is currently 215 pounds at 5'7\".  Patient wears a tight belt as part of his uniform for [employer].    Upon further questioning patient wears this entire close to 7 days out of the week and notices pain increasing when sitting which is peculiar for low back pain and neurogenic claudication.  But more indicative of meralgia paresthetica.  Upon palpation/light touch patient has dysesthetic pain in the right anterior portion of the thigh.  Patient has no intrinsic hip dysfunction.    As I discussed with the patient I think he has multiple things going on.  Both of which should be treated with conservatism.    Low back pain is being managed at this point with Flexeril and Advil/Tylenol.  I will give him a Medrol Dosepak to help with inflammation.  I have also discussed with him the meralgia paresthetica.  We will need to change up a tire and get the pressure off of the lateral femoral cutaneous nerve.  This was explained in detail and he is in understanding.  At this time with him not having dysesthetic pain continuously I think that we should withhold gabapentin.  If his pain were to " continue to worsen we could consider starting on gabapentin or do injections with Dr. Newman.        Review of Systems   Constitutional: Negative for activity change, appetite change, chills, diaphoresis, fatigue, fever and unexpected weight change.   HENT: Negative for congestion, dental problem, drooling, ear discharge, ear pain, facial swelling, hearing loss, mouth sores, nosebleeds, postnasal drip, rhinorrhea, sinus pressure, sneezing, sore throat, tinnitus, trouble swallowing and voice change.    Eyes: Negative for photophobia, pain, discharge, redness, itching and visual disturbance.   Respiratory: Negative for apnea, cough, choking, chest tightness, shortness of breath, wheezing and stridor.    Cardiovascular: Negative for chest pain, palpitations and leg swelling.   Gastrointestinal: Negative for abdominal distention, abdominal pain, anal bleeding, blood in stool, constipation, diarrhea, nausea, rectal pain and vomiting.   Endocrine: Negative for cold intolerance, heat intolerance, polydipsia, polyphagia and polyuria.   Genitourinary: Negative for decreased urine volume, difficulty urinating, dysuria, enuresis, flank pain, frequency, genital sores, hematuria and urgency.   Musculoskeletal: Positive for back pain and myalgias. Negative for arthralgias, gait problem, joint swelling, neck pain and neck stiffness.   Skin: Negative for color change, pallor, rash and wound.   Allergic/Immunologic: Negative for environmental allergies, food allergies and immunocompromised state.   Neurological: Negative for dizziness, tremors, seizures, syncope, facial asymmetry, speech difficulty, weakness, light-headedness, numbness and headaches.   Hematological: Negative for adenopathy. Does not bruise/bleed easily.   Psychiatric/Behavioral: Negative for agitation, behavioral problems, confusion, decreased concentration, dysphoric mood, hallucinations, self-injury, sleep disturbance and suicidal ideas. The patient is not  "nervous/anxious and is not hyperactive.    All other systems reviewed and are negative.      Past Medical History:     Past Medical History:   Diagnosis Date   • Anxiety    • Depression    • Hypertension    • Neck injury        Family History:     Family History   Problem Relation Age of Onset   • Diabetes Father    • Heart disease Father    • Heart disease Paternal Grandfather        Social History:    reports that he has never smoked. He has never used smokeless tobacco. He reports that he does not drink alcohol or use drugs.   SMOKING STATUS: Non-smoker    Surgical History:     Past Surgical History:   Procedure Laterality Date   • APPENDECTOMY N/A 1/10/2017    Procedure: APPENDECTOMY LAPAROSCOPIC;  Surgeon: Nicholas Gifford MD;  Location: [facility];  Service:    • APPENDECTOMY     • KNEE ACL RECONSTRUCTION         Allergies:   Ciprofloxacin; Mustard [allyl isothiocyanate]; Ultram [tramadol hcl]; and Amoxicillin    Physical Exam:    Vital Signs:/88 (BP Location: Right arm, Patient Position: Sitting, Cuff Size: Adult)   Temp 98.7 °F (37.1 °C) (Temporal)   Resp 17   Ht 170.2 cm (67\")   Wt 97.5 kg (215 lb)   BMI 33.67 kg/m²    BMI: Body mass index is 33.67 kg/m².    GENERAL:         The patient is in no acute distress, and is able to answer all questions appropriately.  Neck:        Supple without lymphadenopathy  Musculoskeletal:          strength is 5 out of 5 bilaterally.        Shoulder abduction is 5 out of 5.         Dorsiflexion is 5/5 Bilaterally       Plantarflexion is 5/5 bilaterally       Hip Flexion 5/5 bilaterally.         The patient´s gait is normal without antalgia.  Neurologic:        The patient is alert and oriented by 3.          Pupils are equal and reactive to light.         Visual fields are full.         Extraocular movements are intact without nystagmus.         There is no evidence of central motor drift. No facial droop.  No difficulty with rapid alternating movements.      "    Sensation is intact bilaterally but with light touch on the right anterior thigh patient is very uncomfortable       reflexes:  2+ @ biceps, triceps, brachioradialis, as well as the patellar and Achilles tendon bilaterally.      Medical Decision Making    Data Review:   X-ray of the lumbar spine reviewed and showed no fracture and looks to be a mild subluxation of the L5-S1.  No flexion or extension views available    Diagnosis:   Meralgia paresthetica right  Low back pain   Associated right sciatic hip pain intermittent    Treatment Options:   We will continue with conservatism.  We will give him a Medrol Dosepak to keep him off of heavy duty work for the next few weeks and try some physical therapy.  We will make arrangements for this patient will call us back with any additional needs.    It has been a pleasure providing neurosurgical care.    Aleksandr Brian PA-C      No diagnosis found.

## 2019-07-22 ENCOUNTER — TELEPHONE (OUTPATIENT)
Dept: NEUROSURGERY | Facility: CLINIC | Age: 39
End: 2019-07-22

## 2019-07-22 NOTE — TELEPHONE ENCOUNTER
Provider:  Finn  Caller: self  Time of call:   11:20  Phone #:  [phone]  Surgery:    Surgery Date:    Last visit:  06/24/19   Next visit: n/a    AMRITA:       01/21/2019 Promethazine/Codeine 10MG/5ML/6.25MG/5ML/7.4% QTY:180 3 MELCHOR PRYOR  [id number]) LTAC, located within [facility] 1     01/26/2019 Promethazine/Codeine  10MG/5ML/6.25MG/5ML/7%  QTY: 180 3 ENZO VILLALOBOS [facility]  [employer] LTAC, located within [facility] 1    05/13/2019 Hydrocodone/Acetaminophen 325MG/5MG QTY: 12 3 HADLEY POOLE  [facility] [employer] LTAC, located within [facility] 20 1    Reason for call:       Pt left VM saying he has been to 9 massage appointments however he is still in a great amount of pain. The massages only relieved the pain temporarily.  Pt wants to know if there is a medication that can be prescribed.

## 2019-07-22 NOTE — TELEPHONE ENCOUNTER
Pt called and left another VM saying he missed our call.  I spoke with patient and advised him of Rx called in.

## 2019-07-22 NOTE — TELEPHONE ENCOUNTER
Med called in as darian 100mg #60 NR 2 po QAM for 3 days then 2 po BID thereafter. Called pt, no answer.

## 2019-08-26 DIAGNOSIS — G57.11 MERALGIA PARESTHETICA OF RIGHT SIDE: Primary | ICD-10-CM

## 2019-08-26 RX ORDER — GABAPENTIN 100 MG/1
CAPSULE ORAL
Qty: 120 CAPSULE | Refills: 1 | OUTPATIENT
Start: 2019-08-26 | End: 2019-10-23 | Stop reason: SDUPTHER

## 2019-08-26 NOTE — TELEPHONE ENCOUNTER
Provider:  STORMY Brian  Caller: patient  Time of call:  9:37   Phone #:  [phone]  Surgery:  no  Surgery Date:    Last visit: 06/24/19    Next visit: None    AMRITA:     01/26/2019 Promethazine/Codeine  10MG/5ML/6.25MG/5ML/7%  1980 180 3 [facility]OBOS [facility]  [place] 1  05/13/2019 Hydrocodone/Acetaminophen  325MG/5MG  1980 12 3 HADLEY POOLE  [facility]  [employer]  [employer] [place] 20 1  07/22/2019 Gabapentin 100MG 1980 60 15 JANAE HOFF  [id number]) [facility] 1    Reason for call:    Patient asked for a refill on Gabapentin as it is helping with his pain.

## 2019-09-17 ENCOUNTER — OFFICE VISIT (OUTPATIENT)
Dept: FAMILY MEDICINE CLINIC | Facility: CLINIC | Age: 39
End: 2019-09-17

## 2019-09-17 VITALS
WEIGHT: 235 LBS | SYSTOLIC BLOOD PRESSURE: 108 MMHG | TEMPERATURE: 98.6 F | HEIGHT: 67 IN | BODY MASS INDEX: 36.88 KG/M2 | OXYGEN SATURATION: 98 % | HEART RATE: 108 BPM | DIASTOLIC BLOOD PRESSURE: 74 MMHG

## 2019-09-17 DIAGNOSIS — E34.9 TESTOSTERONE DEFICIENCY: ICD-10-CM

## 2019-09-17 DIAGNOSIS — G57.11 MERALGIA PARESTHETICA OF RIGHT SIDE: ICD-10-CM

## 2019-09-17 DIAGNOSIS — M25.531 RIGHT WRIST PAIN: ICD-10-CM

## 2019-09-17 DIAGNOSIS — I10 ESSENTIAL HYPERTENSION: Primary | ICD-10-CM

## 2019-09-17 DIAGNOSIS — G25.81 RESTLESS LEG SYNDROME: ICD-10-CM

## 2019-09-17 PROBLEM — J45.20 MILD INTERMITTENT ASTHMA WITHOUT COMPLICATION: Status: ACTIVE | Noted: 2019-09-17

## 2019-09-17 PROCEDURE — 99204 OFFICE O/P NEW MOD 45 MIN: CPT | Performed by: FAMILY MEDICINE

## 2019-09-17 RX ORDER — ACETAMINOPHEN,DIPHENHYDRAMINE HCL 500; 25 MG/1; MG/1
2 TABLET, FILM COATED ORAL NIGHTLY PRN
COMMUNITY
End: 2020-12-11

## 2019-09-17 RX ORDER — CHOLECALCIFEROL (VITAMIN D3) 125 MCG
15 CAPSULE ORAL
COMMUNITY
End: 2020-05-18

## 2019-09-17 RX ORDER — VALSARTAN AND HYDROCHLOROTHIAZIDE 160; 25 MG/1; MG/1
1 TABLET ORAL DAILY
Qty: 90 TABLET | Refills: 2 | Status: SHIPPED | OUTPATIENT
Start: 2019-09-17 | End: 2020-12-11 | Stop reason: CLARIF

## 2019-09-17 NOTE — ASSESSMENT & PLAN NOTE
Seeing Neurosurgery.  Prescribed Neurontin 100 bid.  Last Finn Hand 8/26/19 he was prescribed 120 tablets.

## 2019-09-17 NOTE — PROGRESS NOTES
Subjective   Lavelle Cabral is a 38 y.o. male.   Pt has been on blood pressure medication for 20 years.    Has been seeing [facility] clinic and ED.     Refills needed on valsartan-hctz today.    Complains of right hand pain.  Injured hand 9 days. xrays were normal. Still having pain when closing hand.    Hypertension   This is a chronic (he was seen cardiology 2002.  stress test.   bp med for >20 year) problem. The current episode started more than 1 year ago. The problem has been gradually improving since onset. The problem is controlled. Pertinent negatives include no anxiety, blurred vision, chest pain, headaches, malaise/fatigue, neck pain, orthopnea, palpitations, peripheral edema or shortness of breath. There are no associated agents to hypertension. Risk factors for coronary artery disease include male gender and obesity. Past treatments include ACE inhibitors, angiotensin blockers and diuretics. Current antihypertension treatment includes angiotensin blockers and diuretics. The current treatment provides no improvement. There are no compliance problems.  There is no history of angina, kidney disease or CAD/MI. There is no history of sleep apnea or a thyroid problem.   Wrist Pain    The pain is present in the right wrist. This is a new problem. Episode onset: fell down stairs fell right wrist. There has been a history of trauma. The problem occurs constantly. The problem has been gradually worsening. The quality of the pain is described as aching. The pain is at a severity of 3/10. The pain is moderate. Pertinent negatives include no fever, inability to bear weight or itching. Associated symptoms comments: He was also see at [facility] in [place] and was given IV pain meds and was seen in ED.  He was told he may need MRI or CT of right wrist.  The swelling has improved 9/9/18.   . The symptoms are aggravated by activity. He has tried NSAIDS, oral narcotics and movement for the symptoms. Improvement on treatment:  "pain worse with grasping hand acoross 2nd. 3rd 4th distal metacarpals.  Family history does not include gout or rheumatoid arthritis. There is no history of diabetes, gout, osteoarthritis or rheumatoid arthritis.      new patient here to establish care.  He was carrying groceries down stairs and fell on outstretched hand 9/8/19.  Records indicate the xrays of wrist and hand normal however it was recommend he go to ed and he was reluctant to go to ed.      He was seen the next day at [facility] and ED in [place].  He has had 2 sets of xrays.  He was splinted.      I have reviewed records from neurosurgery [facility] 6/24/19.    Restless leg sx for last 1 year.  He has not been sleeping well.  Sleeping 2-3 hours a night.  He reports the neurontin is helping with the pain and restless leg sx.      He also reports he had a low testosterone level in the past and would like it checked again.  No symptoms.    The following portions of the patient's history were reviewed and updated as appropriate: allergies, current medications, past family history, past medical history, past social history, past surgical history and problem list.    Review of Systems   Constitutional: Negative.  Negative for fever and malaise/fatigue.   HENT: Negative.    Eyes: Negative for blurred vision.   Respiratory: Negative.  Negative for shortness of breath.    Cardiovascular: Negative for chest pain, palpitations, orthopnea and leg swelling.   Gastrointestinal: Negative.    Genitourinary: Negative.    Musculoskeletal: Positive for arthralgias and joint swelling. Negative for gout, myalgias and neck pain.   Skin: Negative for itching.   Neurological: Negative for headaches.   Psychiatric/Behavioral: Negative.        Objective     Vitals:    09/17/19 1410   BP: 108/74   Pulse: 108   Temp: 98.6 °F (37 °C)   SpO2: 98%   Weight: 107 kg (235 lb)   Height: 170.2 cm (67\")       Physical Exam   Constitutional: He is oriented to person, place, and time. He appears " well-developed and well-nourished.   HENT:   Head: Normocephalic and atraumatic.   Eyes: Conjunctivae and EOM are normal. Pupils are equal, round, and reactive to light. Right eye exhibits no discharge. Left eye exhibits no discharge. No scleral icterus.   Neck: Normal range of motion. Neck supple. No JVD present. No tracheal deviation present. No thyromegaly present.   Cardiovascular: Normal rate, regular rhythm and normal heart sounds. Exam reveals no gallop and no friction rub.   No murmur heard.  Pulmonary/Chest: Effort normal and breath sounds normal. No respiratory distress. He has no wheezes. He has no rales. He exhibits no tenderness.   Abdominal: Soft. Bowel sounds are normal. He exhibits no distension and no mass. There is no tenderness.   Musculoskeletal: Normal range of motion. He exhibits no edema.   Neurological: He is alert and oriented to person, place, and time. He has normal reflexes. He displays normal reflexes. No cranial nerve deficit. Coordination normal.   Skin: Skin is warm and dry.   Psychiatric: He has a normal mood and affect. His behavior is normal. Judgment and thought content normal.   Nursing note and vitals reviewed.      Assessment/Plan     Problem List Items Addressed This Visit        Cardiovascular and Mediastinum    Essential hypertension - Primary    Relevant Medications    valsartan-hydrochlorothiazide (DIOVAN-HCT) 160-25 MG per tablet    Other Relevant Orders    CBC & Differential    TSH    Comprehensive Metabolic Panel    Hemoglobin A1c    Uric Acid    Lipid Panel       Nervous and Auditory    Meralgia paresthetica of right side    Current Assessment & Plan     Seeing Neurosurgery.  Prescribed Neurontin 100 bid.  Last Finn Hand 8/26/19 he was prescribed 120 tablets.           Right wrist pain    Relevant Orders    Ambulatory Referral to Orthopedic Surgery       Other    Restless leg syndrome    Relevant Orders    Ambulatory Referral to Sleep Medicine      Other Visit  Diagnoses     Testosterone deficiency        Relevant Orders    Testosterone        [facility] reviewed #[id number]

## 2019-09-25 ENCOUNTER — TELEPHONE (OUTPATIENT)
Dept: FAMILY MEDICINE CLINIC | Facility: CLINIC | Age: 39
End: 2019-09-25

## 2019-09-25 NOTE — TELEPHONE ENCOUNTER
MESSAGE FORWARDED TO  AND DR THOMAS.  ----- Message from Leonel Reynoso sent at 9/25/2019 12:29 PM EDT -----  Regarding: Not need RX  Pt called back and said that he has switched PCP and will no longer be needing Dr Thomas to take care of his meds.

## 2019-09-25 NOTE — TELEPHONE ENCOUNTER
"SPOKE WITH PT AND LET HIM KNOW PER DR THOMAS THE CORRECT DOSE OF VALSARTAN-HCTZ WAS SENT IN TO THE PHARMACY. LET PT KNOW IT IS NOT A MEDICATION THAT IS TAKEN TWO TIMES A DAY PER DR THOMAS. PT STATED \"WELL SHE IS STUPID\" REFERRING TO DR THOMAS AND CALL WAS ENDED.  ----- Message from Leonel Dillard sent at 9/25/2019 10:22 AM EDT -----  Regarding: MED ISSUES  Contact: [phone]  PT CALLED AND SAID HE WAS JUST NOW PICKING UP HIS SCRIPTS AND HE NEEDS 320MG OF LASARTIN A DAY SO HE HAVING TO TAKE TWO OF THE valsartan-hydrochlorothiazide (DIOVAN-HCT) 160-25 MG per tablet AND IT IS MAKING HIM DEHYDRATED. HE STATED FOR HIS INSURANCE TO COVER HE NEEDS AN ORDER -25 TAKE HALF TWICE A DAY. COULD YOU PLS CALL AND ADVISE @  [phone]    "

## 2019-10-23 DIAGNOSIS — G57.11 MERALGIA PARESTHETICA OF RIGHT SIDE: ICD-10-CM

## 2019-10-23 RX ORDER — GABAPENTIN 100 MG/1
CAPSULE ORAL
Qty: 120 CAPSULE | Refills: 0 | Status: SHIPPED | OUTPATIENT
Start: 2019-10-23 | End: 2019-11-27 | Stop reason: SDUPTHER

## 2019-10-23 NOTE — TELEPHONE ENCOUNTER
Provider:  Finn   Caller:  Automated refill request  Surgery:  Na   Surgery Date:  Na   Last visit:  6/24/2019  Next visit: tbd    AMRITA:    09/22/2019 Gabapentin 100MG 1980 120 30 JANAE HOFF  [facility] #[id number] [facility] 2    Reason for call:         Requested Prescriptions     Pending Prescriptions Disp Refills   • gabapentin (NEURONTIN) 100 MG capsule [Pharmacy Med Name: GABAPENTIN 100 MG CAPSULE] 120 capsule 0     Sig: TAKE 2 CAPSULES BY MOUTH TWICE A DAY

## 2019-11-27 DIAGNOSIS — G57.11 MERALGIA PARESTHETICA OF RIGHT SIDE: ICD-10-CM

## 2019-11-27 RX ORDER — GABAPENTIN 100 MG/1
CAPSULE ORAL
Qty: 120 CAPSULE | Refills: 0 | Status: SHIPPED | OUTPATIENT
Start: 2019-11-27 | End: 2019-12-06 | Stop reason: SDUPTHER

## 2019-12-06 DIAGNOSIS — G57.11 MERALGIA PARESTHETICA OF RIGHT SIDE: ICD-10-CM

## 2019-12-06 RX ORDER — GABAPENTIN 100 MG/1
CAPSULE ORAL
Qty: 120 CAPSULE | Refills: 0 | OUTPATIENT
Start: 2019-12-06 | End: 2020-01-03 | Stop reason: SDUPTHER

## 2019-12-06 NOTE — TELEPHONE ENCOUNTER
Provider:  Finn  Caller: patient  Time of call:   4:20  Phone #:  [phone]  Surgery:  no  Surgery Date:    Last visit:   06/24/19  Next visit: None    AMRITA:     10/23/2019 Gabapentin 100MG 1980 120 30 JANAE HOFF  LTAC, located within [facility] #[facility] 2  11/27/2019 Gabapentin 100MG 1980 60 10 JANAE HOFF  LTAC, located within [facility] #69[facility] 2  12/01/2019 Hydrocodone/Acetaminophen  325MG/5MG  1978 12 3 RICA DOUGLASS LTAC, located within [facility] #[facility] 20 2    Reason for call:    Patient requests refill on Gabapentin.    He said he had a injury last week and would like to make an apt to come in.  He was diagnosed with sciatica and a bulging disc. Ok to schedule with Aleksandr BURROWS?

## 2020-01-03 DIAGNOSIS — G57.11 MERALGIA PARESTHETICA OF RIGHT SIDE: ICD-10-CM

## 2020-01-03 RX ORDER — GABAPENTIN 100 MG/1
CAPSULE ORAL
Qty: 120 CAPSULE | Refills: 0 | OUTPATIENT
Start: 2020-01-03 | End: 2020-01-31 | Stop reason: SDUPTHER

## 2020-01-03 NOTE — TELEPHONE ENCOUNTER
Provider:  Finn  Caller: patient  Time of call:   11:54  Phone #:  [phone]  Surgery:  no  Surgery Date:    Last visit:   06/24/19  Next visit: 01/06/20    AMRITA:     11/27/2019 Gabapentin 100MG 1980 60 10 JANAE HOFF  [facility] #6942 [facility] 3  12/01/2019 Hydrocodone/Acetaminophen  325MG/5MG  1978 12 3 RICA DOUGLASS [facility] #6940 [facility] 20 3  12/06/2019 Gabapentin 100MG 1980 120 30 JANAE HOFF  [facility] #6942 [facility] 3  12/10/2019 Hydrocodone/Acetaminophen  325MG/5MG  1978 8 2 PENNY RIOS  [facility] [address] [facility] 20 1        Reason for call:   Patient called to request a refill on Gabapentin.  He states that the last refill was for only bid, and he understood to take it tid.

## 2020-01-06 ENCOUNTER — OFFICE VISIT (OUTPATIENT)
Dept: NEUROSURGERY | Facility: CLINIC | Age: 40
End: 2020-01-06

## 2020-01-06 VITALS
DIASTOLIC BLOOD PRESSURE: 82 MMHG | HEIGHT: 67 IN | BODY MASS INDEX: 37.83 KG/M2 | WEIGHT: 241 LBS | TEMPERATURE: 98.7 F | SYSTOLIC BLOOD PRESSURE: 142 MMHG

## 2020-01-06 DIAGNOSIS — M48.062 SPINAL STENOSIS, LUMBAR REGION, WITH NEUROGENIC CLAUDICATION: Primary | ICD-10-CM

## 2020-01-06 PROCEDURE — 99214 OFFICE O/P EST MOD 30 MIN: CPT | Performed by: PHYSICIAN ASSISTANT

## 2020-01-06 RX ORDER — DULOXETIN HYDROCHLORIDE 30 MG/1
30 CAPSULE, DELAYED RELEASE ORAL 2 TIMES DAILY
COMMUNITY

## 2020-01-06 NOTE — PROGRESS NOTES
Patient: Lavelle Cabral  : 1980    Primary Care Provider: Provider, No Known      Chief Complaint: Leg radiculopathy/neurogenic claudication    History of Present Illness:       Patient very nice  krystin Flores initially presented with right anterior thigh pain and meralgia paresthetica.  Patient was treated conservatively with gabapentin and he noticed good relief of his right anterior thigh pain.    About 6 weeks ago patient fell down the stairs and then had onset of an increased low back pain and left leg symptomatology.  This left leg pain gets worse when he is standing and walking for distance and it runs down the back of his leg into the bottom of his foot and also runs over to the anterior shin.    At last visit he had a x-ray that showed a stable L5-S1 spondylolisthesis with no exertional mobility unfortunately with this history of recent fall I am interested to see what the new scans look like.  He went to [facility] and got an MRI as well as a CT scan which I will review next visit.    Patient denies any bowel or bladder incontinence and seems to be very troubled by this.  This making very difficult for him to work and stand for any amount of time.  He is a  which requires a lot of walking and standing.    Review of Systems   Constitutional: Negative for activity change, appetite change, chills, diaphoresis, fatigue, fever and unexpected weight change.   HENT: Negative for congestion, dental problem, drooling, ear discharge, ear pain, facial swelling, hearing loss, mouth sores, nosebleeds, postnasal drip, rhinorrhea, sinus pressure, sneezing, sore throat, tinnitus, trouble swallowing and voice change.    Eyes: Negative for photophobia, pain, discharge, redness, itching and visual disturbance.   Respiratory: Positive for cough and wheezing. Negative for apnea, choking, chest tightness, shortness of breath and stridor.    Cardiovascular: Negative for chest pain,  palpitations and leg swelling.   Gastrointestinal: Negative for abdominal distention, abdominal pain, anal bleeding, blood in stool, constipation, diarrhea, nausea, rectal pain and vomiting.   Endocrine: Negative for cold intolerance, heat intolerance, polydipsia, polyphagia and polyuria.   Genitourinary: Negative for decreased urine volume, difficulty urinating, dysuria, enuresis, flank pain, frequency, genital sores, hematuria and urgency.   Musculoskeletal: Positive for back pain and neck pain. Negative for arthralgias, gait problem, joint swelling, myalgias and neck stiffness.   Skin: Negative for color change, pallor, rash and wound.   Allergic/Immunologic: Negative for environmental allergies, food allergies and immunocompromised state.   Neurological: Negative for dizziness, tremors, seizures, syncope, facial asymmetry, speech difficulty, weakness, light-headedness, numbness and headaches.   Hematological: Negative for adenopathy. Does not bruise/bleed easily.   Psychiatric/Behavioral: Negative for agitation, behavioral problems, confusion, decreased concentration, dysphoric mood, hallucinations, self-injury, sleep disturbance and suicidal ideas. The patient is not nervous/anxious and is not hyperactive.        Past Medical History:     Past Medical History:   Diagnosis Date   • Anxiety    • Depression    • Hypertension    • Neck injury        Family History:     Family History   Problem Relation Age of Onset   • Diabetes Father    • Heart disease Father    • Heart disease Paternal Grandfather        Social History:    reports that he has never smoked. He has never used smokeless tobacco. He reports that he drinks about 3.0 standard drinks of alcohol per week. He reports that he does not use drugs.   SMOKING STATUS: Non-smoker    Surgical History:     Past Surgical History:   Procedure Laterality Date   • APPENDECTOMY N/A 1/10/2017    Procedure: APPENDECTOMY LAPAROSCOPIC;  Surgeon: Nicholas Gifford MD;   "Location: [facility] OR;  Service:    • APPENDECTOMY     • KNEE ACL RECONSTRUCTION         Allergies:   Ciprofloxacin; Mustard [allyl isothiocyanate]; Ultram [tramadol hcl]; and Amoxicillin    Physical Exam:    Vital Signs:Ht 170.2 cm (67\")   BMI 36.81 kg/m²    BMI: Body mass index is 36.81 kg/m².    GENERAL:         The patient is in no acute distress, and is able to answer all questions appropriately.  Neck:        Supple without lymphadenopathy  Musculoskeletal:          strength is 5 out of 5 bilaterally.        Shoulder abduction is 5 out of 5.         Dorsiflexion is 5/5 Bilaterally       Plantarflexion is 5/5 bilaterally       Hip Flexion 5/5 bilaterally.         The patient´s gait is normal but antalgic.  Neurologic:        The patient is alert and oriented by 3.          Pupils are equal and reactive to light.         Visual fields are full.         Extraocular movements are intact without nystagmus.         There is no evidence of central motor drift. No facial droop.  No difficulty with rapid alternating movements.         Sensation is equal bilaterally with no deficit.           Reflexes:  2+ @ biceps, triceps, brachioradialis, as well as the patellar and Achilles tendon bilaterally.    Medical Decision Making    Data Review:   No new films reviewed at this visit    Diagnosis:   Low back pain  Neurogenic claudication  Left leg radiculopathy  History of right anterior thigh pain   Secondary to meralgia paresthetica    Treatment Options:   Patient is going to see me back with his films.  Unfortunately did not bring them with him today.  We will review the MRI as well as a CT of the lumbar spine.  I have also requested he get a flexion and extension x-ray to make sure that he has not had progression of his L5-S1 subluxation.    It has been a pleasure providing neurosurgical care.    Aleksandr Brian PA-C      No diagnosis found.  "

## 2020-01-08 ENCOUNTER — HOSPITAL ENCOUNTER (EMERGENCY)
Facility: HOSPITAL | Age: 40
Discharge: HOME | End: 2020-01-08
Attending: EMERGENCY MEDICINE | Admitting: EMERGENCY MEDICINE

## 2020-01-08 ENCOUNTER — APPOINTMENT (OUTPATIENT)
Dept: CT IMAGING | Facility: HOSPITAL | Age: 40
End: 2020-01-08

## 2020-01-08 VITALS
HEIGHT: 67 IN | HEART RATE: 91 BPM | OXYGEN SATURATION: 100 % | SYSTOLIC BLOOD PRESSURE: 148 MMHG | BODY MASS INDEX: 37.72 KG/M2 | TEMPERATURE: 98.3 F | DIASTOLIC BLOOD PRESSURE: 89 MMHG | WEIGHT: 240.3 LBS | RESPIRATION RATE: 18 BRPM

## 2020-01-08 DIAGNOSIS — S30.0XXA CONTUSION OF LOWER BACK, INITIAL ENCOUNTER: Primary | ICD-10-CM
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
## 2020-01-08 PROCEDURE — 72131 CT LUMBAR SPINE W/O DYE: CPT

## 2020-01-08 PROCEDURE — 25010000002 MORPHINE PER 10 MG: Performed by: EMERGENCY MEDICINE

## 2020-01-08 PROCEDURE — 96375 TX/PRO/DX INJ NEW DRUG ADDON: CPT

## 2020-01-08 PROCEDURE — 25010000002 ONDANSETRON PER 1 MG: Performed by: EMERGENCY MEDICINE

## 2020-01-08 PROCEDURE — 99283 EMERGENCY DEPT VISIT LOW MDM: CPT

## 2020-01-08 PROCEDURE — 96374 THER/PROPH/DIAG INJ IV PUSH: CPT

## 2020-01-08 RX ORDER — ONDANSETRON 2 MG/ML
4 INJECTION INTRAMUSCULAR; INTRAVENOUS ONCE
Status: COMPLETED | OUTPATIENT
Start: 2020-01-08 | End: 2020-01-08

## 2020-01-08 RX ORDER — HYDROCODONE BITARTRATE AND ACETAMINOPHEN 7.5; 325 MG/1; MG/1
1 TABLET ORAL EVERY 4 HOURS PRN
Qty: 10 TABLET | Refills: 0 | Status: SHIPPED | OUTPATIENT
Start: 2020-01-08 | End: 2020-05-18

## 2020-01-08 RX ORDER — METHYLPREDNISOLONE 4 MG/1
TABLET ORAL
Qty: 1 EACH | Refills: 0 | Status: SHIPPED | OUTPATIENT
Start: 2020-01-08 | End: 2020-05-18

## 2020-01-08 RX ORDER — SODIUM CHLORIDE 0.9 % (FLUSH) 0.9 %
10 SYRINGE (ML) INJECTION AS NEEDED
Status: DISCONTINUED | OUTPATIENT
Start: 2020-01-08 | End: 2020-01-08 | Stop reason: HOSPADM

## 2020-01-08 RX ORDER — MORPHINE SULFATE 4 MG/ML
8 INJECTION, SOLUTION INTRAMUSCULAR; INTRAVENOUS ONCE
Status: COMPLETED | OUTPATIENT
Start: 2020-01-08 | End: 2020-01-08

## 2020-01-08 RX ADMIN — MORPHINE SULFATE 8 MG: 4 INJECTION INTRAVENOUS at 19:29

## 2020-01-08 RX ADMIN — ONDANSETRON 4 MG: 2 INJECTION INTRAMUSCULAR; INTRAVENOUS at 19:30

## 2020-01-09 NOTE — ED NOTES
Pt reports he was taking [place] lights off of a roof and he feel off of the roof. States he landed on his back. C/o lower back pain that radiates to his LLE.      Diana Enciso LPN  01/08/20 1932

## 2020-01-09 NOTE — ED PROVIDER NOTES
Subjective   History of Present Illness  Low back pain  39-year-old male complains of increasing severity low back pain after falling off his mother's roof while he was removing [place] lights around 2 PM today.  States he fell around 10 feet onto the dirt.  States he landed on his low back.  He complains of pain proximal lower back.  He states he does have some pain that radiates down his left leg he denies numbness or weakness or bowel or bladder dysfunction.  He has been ambulating since the fall.  He reports no neck or upper back pain or head injury.  He reports no chest or abdominal pain.  Review of Systems   Constitutional: Negative.    HENT: Negative.    Respiratory: Negative.    Cardiovascular: Negative.    Gastrointestinal: Negative.    Genitourinary: Negative.    Musculoskeletal: Positive for back pain.   Skin: Negative.    Neurological: Negative.        Past Medical History:   Diagnosis Date   • Anxiety    • Depression    • Hypertension    • Neck injury        Allergies   Allergen Reactions   • Ciprofloxacin Swelling   • Mustard [Allyl Isothiocyanate]    • Ultram [Tramadol Hcl] Other (See Comments)     Single seizure. Due to taking Tramadol & Cymbalta in combination. -Per pt.    • Amoxicillin Rash       Past Surgical History:   Procedure Laterality Date   • APPENDECTOMY N/A 1/10/2017    Procedure: APPENDECTOMY LAPAROSCOPIC;  Surgeon: Nicholas Gifford MD;  Location: [facility] OR;  Service:    • APPENDECTOMY     • KNEE ACL RECONSTRUCTION         Family History   Problem Relation Age of Onset   • Diabetes Father    • Heart disease Father    • Heart disease Paternal Grandfather        Social History     Socioeconomic History   • Marital status: Single     Spouse name: Not on file   • Number of children: Not on file   • Years of education: Not on file   • Highest education level: Not on file   Tobacco Use   • Smoking status: Never Smoker   • Smokeless tobacco: Never Used   Substance and Sexual Activity   •  "Alcohol use: Yes     Alcohol/week: 3.0 standard drinks     Types: 3 Cans of beer per week   • Drug use: No   • Sexual activity: Yes     Partners: Male   Social History Narrative    Pt is in committed 23 year relationship. Pt works at [employer]. No children.         Prior to Admission medications    Medication Sig Start Date End Date Taking? Authorizing Provider   diphenhydrAMINE-acetaminophen (TYLENOL PM)  MG tablet per tablet Take 2 tablets by mouth At Night As Needed for Sleep.    ProviderKeith MD   DULoxetine (CYMBALTA) 30 MG capsule Take 30 mg by mouth 2 (Two) Times a Day.    Keith Ulloa MD   gabapentin (NEURONTIN) 100 MG capsule TAKE 2 CAPSULES BY MOUTH TWICE A DAY 1/3/20   Aleksandr Brian PA-C   melatonin 5 MG tablet tablet Take 15 mg by mouth.    ProviderKeith MD   valsartan-hydrochlorothiazide (DIOVAN-HCT) 160-25 MG per tablet Take 1 tablet by mouth Daily. 9/17/19   Tracy Mcmillan MD     BP (!) 158/105 (BP Location: Left arm, Patient Position: Sitting)   Pulse 110   Temp 98.5 °F (36.9 °C) (Oral)   Resp 20   Ht 170.2 cm (67\")   Wt 109 kg (240 lb 4.8 oz)   SpO2 100%   BMI 37.64 kg/m²     Objective   Physical Exam  General: Well-developed well-appearing, no acute distress, alert and appropriate  Eyes: Pupils round and reactive, sclera nonicteric  HEENT: Mucous membranes moist, no mucosal swelling, normocephalic, atraumatic  Neck: C-spine and thoracic spine nontender  Lumbar spine has some tenderness palpation across the lower lumbar region of his back, no step-off or crepitus, no external evidence of trauma  Respirations: Respirations nonlabored, equal breath sounds bilaterally, clear lungs  Heart regular rate and rhythm,   Abdomen soft nontender, pelvis stable  Extremities no point bony tenderness  Neuro GCS 15, equal sensorimotor function strength in the bilateral lower extremities, normal and equal deep tendon reflexes bilaterally  Psych oriented, pleasant affect  Skin " no rash, brisk cap refill  Procedures           ED Course          Ct Lumbar Spine Without Contrast    Result Date: 1/8/2020   1.  No acute lumbar spine fracture. 2.  Degenerative disc disease with mild disc bulge at the L4/5 level resulting in mild spinal canal stenosis.  Electronically Signed By-Pieter Lawrence On:1/8/2020 8:04 PM This report was finalized on [id number] by  Pieter Lawrence, .                                          MDM  Patient presented about 5 hours after falling off a roof.  He has no other signs of injury.  Complaining of pain in his lower back.  The CT scan was negative for fracture.  He does have degenerative disc disease.  He has a normal neurologic exam without signs of cauda equina syndrome.  He was given pain nausea medication.  He was advised the findings he is discharged good condition and advised on the need for follow-up with his doctor if symptoms persist for further evaluation which could include MRI evaluation if symptoms persist.  He was given warning signs for return.  Inspect report ordered and patient prescribed a short course of Norco and Medrol Dosepak.  Final diagnoses:   Contusion of lower back, initial encounter   Fall, initial encounter            Deep Pérez MD  01/08/20 2014

## 2020-01-09 NOTE — DISCHARGE INSTRUCTIONS
Return for increased pain fever vomiting, shortness of air, numbness, weakness or any other concerns.  Ice packs to affected area for 20 minutes every 4-6 hours.

## 2020-01-13 ENCOUNTER — TELEPHONE (OUTPATIENT)
Dept: NEUROSURGERY | Facility: CLINIC | Age: 40
End: 2020-01-13

## 2020-01-31 DIAGNOSIS — G57.11 MERALGIA PARESTHETICA OF RIGHT SIDE: ICD-10-CM

## 2020-01-31 RX ORDER — GABAPENTIN 100 MG/1
300 CAPSULE ORAL 3 TIMES DAILY
Qty: 90 CAPSULE | Refills: 0 | OUTPATIENT
Start: 2020-01-31 | End: 2020-03-24 | Stop reason: DRUGHIGH

## 2020-01-31 NOTE — TELEPHONE ENCOUNTER
Provider:  Finn  Caller:  Automated refill request  Surgery:  NA  Surgery Date:    Last visit:  01/06/20  Next visit: NA    Reason for call:     Pt is requesting a refill on rx- requesting dose to be increased to 300mg instead of 200 mg.  Pt states he had 300 mg TID in the past which helped more than 200 mg TID.         Requested Prescriptions     Pending Prescriptions Disp Refills   • gabapentin (NEURONTIN) 100 MG capsule  0     Sig: TAKE 2 CAPSULES BY MOUTH TWICE A DAY     AMRITA:     01/03/2020 Gabapentin 100MG 1978 120 30 JANAE HOFF  [facility] #6942 [facility] 3  01/08/2020 Hydrocodone/Acetaminophen  325MG/7.5MG  1978 10 2 JESUS COOMBS [facility] #6208 [facility] 38 3  01/20/2020 Oxycodone/Acetaminophen 325MG/5MG 1978 20 4 MIGUELITO LIN [facility] 38 1

## 2020-01-31 NOTE — TELEPHONE ENCOUNTER
Isael 300 MG # 90 0RF TID [facility] [place] per pt request. I called the pt and left him a VM letting him know we called this in.

## 2020-01-31 NOTE — TELEPHONE ENCOUNTER
Pt called again at 1341 and left another VM. Pt states he is going out of town and has requested that if this is approved, we call it in to [facility] in [place] at [phone]

## 2020-02-28 ENCOUNTER — TELEPHONE (OUTPATIENT)
Dept: NEUROSURGERY | Facility: CLINIC | Age: 40
End: 2020-02-28

## 2020-02-28 NOTE — TELEPHONE ENCOUNTER
Provider:  Finn  Caller: self  Time of call:  11:01A   Phone #:  [phone]  Surgery:    Surgery Date:    Last visit:   01/06/2020  Next visit: TBD        Reason for call:       Pt left voicemail requesting a refill on Gabapentin 300mg TID Rx.

## 2020-02-28 NOTE — TELEPHONE ENCOUNTER
Rx phoned into pharmacy.    Attempted to inform patient however the 2 numbers I called were not available.

## 2020-02-28 NOTE — TELEPHONE ENCOUNTER
01/31/2020 Gabapentin 300MG 1978 90 30 JANAE HOFF  [place] [facility] [place] 4    02/03/2020 Hydrocodone/Acetaminophen  325MG/10MG  1978 9 3 JESUS TOLLIVER  [place] [facility] [place] 30 4    02/07/2020 Buprenorphine Hydrochlori 8MG/2MG 1978 2 2 JR RACHEL LEVIN [facility] [place] 3    02/08/2020 Buprenorphine Hcl/Naloxon 8MG/2MG 1978 12 6 JR RACHEL LEVIN [place] [facility] [place] 4    02/14/2020 Buprenorphine Hcl/Naloxon 8MG/2MG 1978 14 7 JR RACHEL LEVIN [place] [facility] 4    02/21/2020 Buprenorphine Hcl/Naloxon 8MG/2MG 1978 22 11 MARTINEZ SEAY  [place] [facility] 4

## 2020-03-24 DIAGNOSIS — M54.16 RADICULOPATHY OF LUMBAR REGION: Primary | ICD-10-CM

## 2020-03-24 RX ORDER — GABAPENTIN 400 MG/1
400 CAPSULE ORAL 3 TIMES DAILY
Qty: 90 CAPSULE | Refills: 0 | OUTPATIENT
Start: 2020-03-24 | End: 2020-04-15 | Stop reason: SDUPTHER

## 2020-03-24 NOTE — TELEPHONE ENCOUNTER
Patient [facility], [phone] Not the one on [address]

## 2020-03-24 NOTE — TELEPHONE ENCOUNTER
Provider:  Finn  Caller: patient  Time of call:  1:59   Phone #:  [phone]  Surgery: no   Surgery Date:    Last visit:   01/06/2020  Next visit:     AMRITA:   02/21/2020 Buprenorphine Hcl/Naloxon 8MG/2MG 1978 22 11 MARTINEZ SEAY  [place] [facility] 4  02/28/2020 Gabapentin 300MG 1978 90 30 JANAE BRISENO  [place] [facility] 4  03/03/2020 Buprenorphine Hcl/Naloxon 8MG/2MG 1978 20 10 JR RACHEL LEVIN [place] [facility] 4  03/14/2020 Buprenorphine Hcl/Naloxon 8MG/2MG 1978 18 9 MJ LITTLE [facility] 4      Reason for call:    Patient called and said because of his increased LLE pain he has taken more Gabapentin than prescribed.     He is requesting a refill and an increase in his dosage.  He states he has done well taking 400 mg tid.    He is under guarantee for 14 days due to exposure to the Corona virus.  He wanted Dr. Briseno to know that he has lost some weight.  He is now down to 212 lb.

## 2020-03-24 NOTE — TELEPHONE ENCOUNTER
Gabapentin called in to pt's pharmacy -please sign to reflect in chart.    -Goldie, please let pt know his meds was called in to his [facility] pharmacy on [address]. Please schedule him for 2-3 weeks out with Aleksandr. (After quarantine)

## 2020-04-03 ENCOUNTER — TELEPHONE (OUTPATIENT)
Dept: NEUROSURGERY | Facility: CLINIC | Age: 40
End: 2020-04-03

## 2020-04-03 RX ORDER — METHOCARBAMOL 750 MG/1
750 TABLET, FILM COATED ORAL 3 TIMES DAILY PRN
Qty: 90 TABLET | Refills: 0 | Status: SHIPPED | OUTPATIENT
Start: 2020-04-03 | End: 2020-04-03

## 2020-04-03 RX ORDER — METHOCARBAMOL 500 MG/1
500 TABLET, FILM COATED ORAL 3 TIMES DAILY PRN
Qty: 90 TABLET | Refills: 0 | Status: SHIPPED | OUTPATIENT
Start: 2020-04-03 | End: 2020-05-04 | Stop reason: SDUPTHER

## 2020-04-03 RX ORDER — METHOCARBAMOL 750 MG/1
750 TABLET, FILM COATED ORAL 3 TIMES DAILY PRN
Qty: 90 TABLET | Refills: 0 | Status: SHIPPED | OUTPATIENT
Start: 2020-04-03 | End: 2020-04-03 | Stop reason: DRUGHIGH

## 2020-04-03 NOTE — TELEPHONE ENCOUNTER
Per pt.   750mg TID is too strong for him. He is requesting 500mg TID.    Ok per PAStaciaC to call this in.

## 2020-04-03 NOTE — TELEPHONE ENCOUNTER
Pt. CALLED IN TO SEE IF HE COULD GET ROBAXIN 500MG PRESCRIBED TO HIM.  HE COULD NOT REMEMBER IF DR HOFF PRESCRIBED IT BEFORE AND I DID NOT SEE IT IN THE LIST.  HE CLAIMS HIS DOSAGE WAS 1000MG- TWICE DAILY.  PLEASE ADVISE PATIENT.  IF THIS IS AN OPTION PLEASE CALL [facility] [phone].    [phone] -

## 2020-04-03 NOTE — TELEPHONE ENCOUNTER
Pt requesting RX be sent to [facility].  #[phone]    RX at [facility] was cancelled.

## 2020-04-15 DIAGNOSIS — M54.16 RADICULOPATHY OF LUMBAR REGION: ICD-10-CM

## 2020-04-16 RX ORDER — GABAPENTIN 400 MG/1
400 CAPSULE ORAL 3 TIMES DAILY
Qty: 90 CAPSULE | Refills: 0 | OUTPATIENT
Start: 2020-04-16 | End: 2020-05-15 | Stop reason: SDUPTHER

## 2020-04-16 NOTE — TELEPHONE ENCOUNTER
Provider:  Finn  Caller: patient  Time of call:    Phone #:  my-chart message  Surgery: no   Surgery Date:    Last visit:   01/06/2020  Next visit: today- cancelled     Pt requests a refill of Gabapentin and video visit with Aleksandr. I spoke with Aleksandr, he would like the pt to mail us his discs from [place]. When we have received those we can schedule a video visit. I spoke with the pt, he understands he needs to get his discs from [place] and mail them here for our review. He will work on that and let us know when he has sent them.      Aleksandr agreed to the Gabapentin refill. Order pended for approval, I will call into the pharmacy.

## 2020-04-23 ENCOUNTER — TELEPHONE (OUTPATIENT)
Dept: NEUROSURGERY | Facility: CLINIC | Age: 40
End: 2020-04-23

## 2020-04-23 DIAGNOSIS — M48.062 SPINAL STENOSIS, LUMBAR REGION, WITH NEUROGENIC CLAUDICATION: Primary | ICD-10-CM

## 2020-04-23 NOTE — TELEPHONE ENCOUNTER
Provider:  Finn  Caller: sammy Santiago    Surgery:  None  Surgery Date:    Last visit:   01/06/20  Next visit:   NA    Reason for call:       ----- Message from Lavelle Cabral sent at 4/23/2020  7:24 AM EDT -----  Regarding: Prescription Question  Contact: [phone]  Hello. I have a quick question. Yesterday, I used a Tens Unit that a friend has and it was unbelievable how good it helped my leg. Albeit, it was for a short time of about 4 hours that I got relief, it was about 80-90% effective at eliminating pain and tightness in my leg. It even helped some on my back. It didn't help as much there as my leg. But I was wondering if there is anyway you can write a script for one? If you can, perhaps the [email or web address] or [facility] in [place] could fill it? I've sent [facility] the letter in the mail asking for my records for the 2nd time. When I get them back, I'll message or call. Thanks!    Lavelle Cabral  [phone] or [phone]

## 2020-04-27 NOTE — TELEPHONE ENCOUNTER
Pt said his insurance would not cover TENS unit so he plans to purchase one from [employer] or "[employer] no order needed for that.

## 2020-04-29 ENCOUNTER — PATIENT MESSAGE (OUTPATIENT)
Dept: NEUROSURGERY | Facility: CLINIC | Age: 40
End: 2020-04-29

## 2020-04-29 ENCOUNTER — TELEPHONE (OUTPATIENT)
Dept: NEUROSURGERY | Facility: CLINIC | Age: 40
End: 2020-04-29

## 2020-04-29 NOTE — TELEPHONE ENCOUNTER
Patient tommy been seen for both low back pain as well as meralgia paresthetica.  Patient was prescribed gabapentin for the meralgia paresthetica due to it not being a surgical problem.    Patient was also seen for his back pain and muscle spasms that he was experiencing while at work.  Patient therefore was prescribed muscle relaxers.  We would like to transition these to primary care because we do not plan on operating on this gentleman.  We do not continue long-term medicines in most regards.  We have been making exceptions the last couple of months due to the pandemic but hopefully when things get back and going patient will not need to see us for some time.

## 2020-04-29 NOTE — TELEPHONE ENCOUNTER
----- Message from Lavelle Cabral sent at 4/29/2020  9:49 AM EDT -----  Regarding: Non-Urgent Medical Question  Contact: [phone]  Hel. My family doctor wants to know from all of my physicians what I'm receiving each medication for. Is it possible to send via email or mail why I'm being prescribed muscle relaxers and gabapentin? Thanks    Lavelle Cabral ([email or web address])  [address] [place] [address]

## 2020-05-04 RX ORDER — METHOCARBAMOL 500 MG/1
500 TABLET, FILM COATED ORAL 3 TIMES DAILY PRN
Qty: 90 TABLET | Refills: 0 | Status: SHIPPED | OUTPATIENT
Start: 2020-05-04 | End: 2020-06-15 | Stop reason: SDUPTHER

## 2020-05-04 NOTE — TELEPHONE ENCOUNTER
Provider:  Finn  Caller: sammy Santiago  Surgery:  N/A  Surgery Date: N/A   Last visit:   01/06/20  Next visit: TBD    Pt sent mychart refill request. Med pended.

## 2020-05-14 ENCOUNTER — TELEPHONE (OUTPATIENT)
Dept: GASTROENTEROLOGY | Facility: CLINIC | Age: 40
End: 2020-05-14

## 2020-05-15 DIAGNOSIS — M54.16 RADICULOPATHY OF LUMBAR REGION: ICD-10-CM

## 2020-05-15 RX ORDER — GABAPENTIN 400 MG/1
400 CAPSULE ORAL 3 TIMES DAILY
Qty: 90 CAPSULE | Refills: 0 | OUTPATIENT
Start: 2020-05-15 | End: 2020-06-15 | Stop reason: SDUPTHER

## 2020-05-15 NOTE — TELEPHONE ENCOUNTER
Medication called in to pt's pharmacy. Pt aware via my chart message that this is last refill. Awaiting his response in regard to appt.

## 2020-05-15 NOTE — TELEPHONE ENCOUNTER
"Pt's my chart message response:  \"OK. I am having severe pain in my leg preventing me from sleeping. The gabapentin tames it down. Now, with the 400, it probably makes about half the pain go away, plus I take a couple Advil PM's to try to sleep. I've continued to lose weight because the doctor told me the weight was a factor, but so far, it hasn't helped. My back is still painful, especially my lower back. If I squat a few times or lift anything at all greater than about 20 lbs, my back hurts so badly, I cry.\"    "

## 2020-05-15 NOTE — TELEPHONE ENCOUNTER
Provider:  Finn  Last visit:   1/6/2020  Next visit: KAYLENE CROWE:       04/20/2020 Gabapentin 400MG 1978 90 30 [facility]azan [facility]iseno [facility] [place] 2  03/24/2020 Gabapentin 400MG 1978 90 30 [facility] 2  02/28/2020 Gabapentin 300MG 1978 90 30 Yazan Briseno [facility] #6362 [facility] 6     Reason for call:       Pt requesting refill on Gabapentin.     He is also requesting an appointment on June 13-15th. This ok?

## 2020-05-15 NOTE — TELEPHONE ENCOUNTER
I will refill the medication at this time, but per Aleksandr last office note he wants to transition to primary care because they do not plan on operating on this gentleman.  He will need to receive refills from PCP from now.     Also, why does he want a visit? Have his symptoms changed? If he needs a visit, schedule with Aleksandr. But not sure if he needs to f/u with us.

## 2020-05-18 ENCOUNTER — OFFICE VISIT (OUTPATIENT)
Dept: UROLOGY | Facility: CLINIC | Age: 40
End: 2020-05-18

## 2020-05-18 VITALS — WEIGHT: 223 LBS | BODY MASS INDEX: 35 KG/M2 | HEIGHT: 67 IN | TEMPERATURE: 98.2 F

## 2020-05-18 DIAGNOSIS — N52.9 ERECTILE DYSFUNCTION, UNSPECIFIED ERECTILE DYSFUNCTION TYPE: ICD-10-CM

## 2020-05-18 DIAGNOSIS — R79.89 LOW TESTOSTERONE: Primary | ICD-10-CM

## 2020-05-18 DIAGNOSIS — N42.9 DISORDER OF PROSTATE: ICD-10-CM

## 2020-05-18 DIAGNOSIS — E29.1 HYPOGONADISM IN MALE: ICD-10-CM

## 2020-05-18 PROCEDURE — 84153 ASSAY OF PSA TOTAL: CPT | Performed by: NURSE PRACTITIONER

## 2020-05-18 PROCEDURE — 85027 COMPLETE CBC AUTOMATED: CPT | Performed by: NURSE PRACTITIONER

## 2020-05-18 PROCEDURE — 99214 OFFICE O/P EST MOD 30 MIN: CPT | Performed by: NURSE PRACTITIONER

## 2020-05-18 RX ORDER — TESTOSTERONE CYPIONATE 200 MG/ML
INJECTION, SOLUTION INTRAMUSCULAR
Qty: 10 ML | Refills: 2 | Status: SHIPPED | OUTPATIENT
Start: 2020-05-18 | End: 2021-09-13 | Stop reason: SDUPTHER

## 2020-05-18 RX ORDER — TADALAFIL 5 MG/1
5 TABLET ORAL DAILY PRN
Qty: 30 TABLET | Refills: 6 | Status: SHIPPED | OUTPATIENT
Start: 2020-05-18 | End: 2020-10-14 | Stop reason: SDUPTHER

## 2020-05-18 NOTE — PROGRESS NOTES
"Chief Complaint:          Chief Complaint   Patient presents with   • Low Testosterone     New Patient With Hypogonadism, ED       HPI:   39 y.o. male.  Patient presents to clinic to establish care and resume his testosterone therapy.     He reports he recently moved to the area. He has been on Testosterone replacement therapy 1 cc weekly and monitored at [facility] in [place] since 2017. He reports his last Testosterone levels were 223 [facility].    Patient reports since stopping therapy a year ago, He feels low energied and depressed. He  States he now feels fatigued all the time and lacks motivation to do anything. He reports there has been a significant  decrease in his libido or sex drive, a total lack of energy, decreased strength and endurance, decreased \"enjoyment of life\".    He reports \"it's hard to even get out of the bed, my sex drive is non-existent, my leg hair was once very thick as recently as 5 years ago and was still normal while getting injections, as was my sex drive and energy. It also takes about 2 weeks to grow the stubble face hair that I could always grow in about 3 days during treatment and in my mid-30's. I also struggle with sleep when my testosterone is low, and have emotional swings\".     He reports sad and grumpy feelings recently, with  significant difficulty maintaining erections.He has also noticed a remarkable recent deterioration regarding his work performance, and would like to resume therapy.    He states he is relatively healthy with no major medical problems besides HTN, Anxiety and Depression, and a Neck Injury.    Past Medical History:        Past Medical History:   Diagnosis Date   • Anxiety    • Depression    • Hypertension    • Neck injury      The following portions of the patient's history were reviewed and updated as appropriate: allergies, current medications, past family history, past medical history, past social history, past " "surgical history and problem list.    Current Meds:     Current Outpatient Medications   Medication Sig Dispense Refill   • diphenhydrAMINE-acetaminophen (TYLENOL PM)  MG tablet per tablet Take 2 tablets by mouth At Night As Needed for Sleep.     • DULoxetine (CYMBALTA) 30 MG capsule Take 30 mg by mouth 2 (Two) Times a Day.     • gabapentin (NEURONTIN) 400 MG capsule Take 1 capsule by mouth 3 (Three) Times a Day. 90 capsule 0   • methocarbamol (Robaxin) 500 MG tablet Take 1 tablet by mouth 3 (Three) Times a Day As Needed for Muscle Spasms. Please cancel 750mg TID 90 tablet 0   • valsartan-hydrochlorothiazide (DIOVAN-HCT) 160-25 MG per tablet Take 1 tablet by mouth Daily. 90 tablet 2   • Syringe 25G X 5/8\" 3 ML misc Use as directed 2 x weekly 24 each 3   • tadalafil (Cialis) 5 MG tablet Take 1 tablet by mouth Daily As Needed for Erectile Dysfunction. Take one Daily 30 tablet 6   • Testosterone Cypionate (Depo-Testosterone) 200 MG/ML injection Inject 1/2 cc subcutaneously every Monday and Thursday 10 mL 2     No current facility-administered medications for this visit.         Allergies:      Allergies   Allergen Reactions   • Ciprofloxacin Swelling   • Mustard [Allyl Isothiocyanate]    • Ultram [Tramadol Hcl] Other (See Comments)     Single seizure. Due to taking Tramadol & Cymbalta in combination. -Per pt.    • Amoxicillin Rash        Past Surgical History:     Past Surgical History:   Procedure Laterality Date   • APPENDECTOMY N/A 1/10/2017    Procedure: APPENDECTOMY LAPAROSCOPIC;  Surgeon: Nicholas Gifford MD;  Location: [facility];  Service:    • APPENDECTOMY     • HERNIA REPAIR     • KNEE ACL RECONSTRUCTION           Social History:     Social History     Socioeconomic History   • Marital status: Single     Spouse name: Not on file   • Number of children: Not on file   • Years of education: Not on file   • Highest education level: Not on file   Tobacco Use   • Smoking status: Never Smoker   • Smokeless " tobacco: Never Used   Substance and Sexual Activity   • Alcohol use: Yes     Alcohol/week: 3.0 standard drinks     Types: 3 Cans of beer per week   • Drug use: No   • Sexual activity: Yes     Partners: Male   Social History Narrative    Pt is in committed 23 year relationship. Pt works at [employer]. No children.       Family History:     Family History   Problem Relation Age of Onset   • Diabetes Father    • Heart disease Father    • Heart disease Paternal Grandfather        Review of Systems:     Review of Systems   Constitutional: Positive for activity change, appetite change and fatigue. Negative for chills and fever.   HENT: Negative for congestion and sinus pressure.    Eyes: Negative for blurred vision, double vision, pain and itching.   Respiratory: Negative for chest tightness, shortness of breath and wheezing.    Cardiovascular: Negative for chest pain.   Gastrointestinal: Positive for constipation. Negative for abdominal pain, diarrhea, nausea and vomiting.   Endocrine: Negative for heat intolerance.   Genitourinary: Negative for difficulty urinating, discharge, dysuria, flank pain, frequency, genital sores, hematuria, penile pain, penile swelling, scrotal swelling, testicular pain, urgency and urinary incontinence.   Musculoskeletal: Negative for back pain.   Allergic/Immunologic: Negative for food allergies.   Neurological: Negative for dizziness, weakness, headache and confusion.   Hematological: Does not bruise/bleed easily.   Psychiatric/Behavioral: Positive for sleep disturbance, depressed mood and stress. Negative for agitation, behavioral problems and decreased concentration. The patient is not nervous/anxious.         Physical Exam:     Physical Exam   Constitutional: He is oriented to person, place, and time. He appears well-developed and well-nourished. He appears distressed.   HENT:   Head: Normocephalic and atraumatic.   Right Ear: External ear normal.   Left Ear: External ear normal.   Eyes:  Pupils are equal, round, and reactive to light. Conjunctivae and EOM are normal. Right eye exhibits no discharge. Left eye exhibits no discharge.   Neck: Normal range of motion. Neck supple. No tracheal deviation present. No thyromegaly present.   Cardiovascular: Normal rate and regular rhythm. Exam reveals no friction rub.   No murmur heard.  Pulmonary/Chest: Effort normal and breath sounds normal. No stridor. No respiratory distress.   Abdominal: Soft. Bowel sounds are normal. He exhibits no distension. There is no tenderness. There is no guarding.   Genitourinary: Rectum normal, testes normal and penis normal. Rectal exam shows guaiac negative stool. Uncircumcised. No penile tenderness. No discharge found.   Musculoskeletal: Normal range of motion. He exhibits tenderness. He exhibits no edema or deformity.   Neurological: He is alert and oriented to person, place, and time. No cranial nerve deficit. Coordination normal.   Skin: Skin is warm and dry. Capillary refill takes less than 2 seconds. No pallor.   Psychiatric: He has a normal mood and affect. His behavior is normal. Judgment and thought content normal.       Procedure:       Assessment/Plan:     Hypogonadism / Low T/ ED: Very Pleasant Pt presents to clinic today to establish care. His last testosterone was 223. He has been on Testosterone replacement therapy at [facility] in [place] and recently moved to the area. It has been over One Year since he had a Testosterone Injections.    He Presents to clinic today with signs and symptoms that are consistent with low testosterone. He has a positive Maksim questionnaire, which by history  includes both the sexual and nonsexual side effects.  Sexual side effects include inability to achieve and maintain an erection, and decreased interest in sexual activity.      Nonsexual symptomatology includes fatigue, difficulty completing a job, tiredness, insomnia, hair loss, sad and grumpy feelings. We discussed  the various forms of testosterone available including parenteral, topical, and the form of a patch. We discussed the efficacy of the gels, and the injections.  As well as the cost and benefits analysis. He would like to resume therapy with the injections.    He also understands this is a controlled substance and as such will not be prescribed without appropriate follow-up and appropriate laboratory investigation.  He has return demonstrated ability in the technique of both intramuscular and subcutaneous injection.  He has been taught sterility on drawing up the medication.     We discussed the different studies and controversies surrounding testosterone injections and also talked about heart disease and its effect on prostate cancer both of which are negligible. He understands the risks and benefits as we discussed at length. He understands the partial effects on spermatogenesis including the fact that this is not always completely reversible and not always, but can completely limit his ability to father a child. He affirmed the fact that he has a Partner and also completed his attempts at fertility and no longer wants children at this time.     He is agreeable to treatment as discussed above, and gives verbal consent to proceed with treatment.    Discussed his plan of care with Dr. Costa who is also agreeable and has prescribed his Testosterone and Cialis at this time.    Will see him in Two Months for follow up.      Patient's Body mass index is 34.93 kg/m². BMI is above normal parameters. Recommendations include: educational material, exercise counseling and nutrition counseling.    Smoking Cessation Counseling:  Never a smoker.  Patient does not currently use any tobacco products.     Counseling was given to patient for the following topics diagnostic results including: Hypogonadismy, low T, ED and instructions for management as follows: Resume Testosterone injections 1/2 cc twice weekly, cialis 5mg PRN. The  interim medical history and current results were reviewed.  A treatment plan with follow-up was made for Low testosterone [R79.89].         This document has been electronically signed by Griselda Cheng-Akwa, APRN May 18, 2020 15:22  Answers for HPI/ROS submitted by the patient on 5/17/2020   What is the primary reason for your visit?: Other  Please describe your symptoms.: In the past, I've received testosterone injections and Cialis with [facility] in [place]. It has been about a year since I last had an injection there and now I've moved to [facility]. In 2017, my testosterone was incredibly low (just 17 ng/dl). Now, without treatment, it's hard to even get out of the bed, my sex drive is non-existent, my leg hair was once very thick as recently as 5 years ago and was still normal while getting injections, as was my sex drive and energy. It also takes about 2 weeks to grow the stubble face hair that I could always grow in about 3 days during treatment and in my mid-30's. I also struggle with sleep when my testosterone is low, and have emotional swings. I'm just 39 and would like a sense of normalcy again. I'd be happy with any form of testosterone treatment. Right now, it's a task just to gather enough energy to make it through the day.  Have you had these symptoms before?: Yes  How long have you been having these symptoms?: Greater than 2 weeks  Please list any medications you are currently taking for this condition.: None at this time, but took Cialis and testosterone shots (in-clinic) previously [id number].  Please describe any probable cause for these symptoms. : I was told that my testes are of normal size and my pituitary gland just doesn't regulate like it should. I've had scans and bloodwork. My brain appears normal and without compromise and I had bloodwork recently and I am both Hep and HIV Negative. My only real problem is that my body isn't functioning like it should and the result is  frustrating to say the least.

## 2020-05-18 NOTE — PATIENT INSTRUCTIONS
Erectile Dysfunction  Erectile dysfunction (ED) is the inability to get or keep an erection in order to have sexual intercourse. Erectile dysfunction may include:  · Inability to get an erection.  · Lack of enough hardness of the erection to allow penetration.  · Loss of the erection before sex is finished.  What are the causes?  This condition may be caused by:  · Certain medicines, such as:  ? Pain relievers.  ? Antihistamines.  ? Antidepressants.  ? Blood pressure medicines.  ? Water pills (diuretics).  ? Ulcer medicines.  ? Muscle relaxants.  ? Drugs.  · Excessive drinking.  · Psychological causes, such as:  ? Anxiety.  ? Depression.  ? Sadness.  ? Exhaustion.  ? Performance fear.  ? Stress.  · Physical causes, such as:  ? Artery problems. This may include diabetes, smoking, liver disease, or atherosclerosis.  ? High blood pressure.  ? Hormonal problems, such as low testosterone.  ? Obesity.  ? Nerve problems. This may include back or pelvic injuries, diabetes mellitus, multiple sclerosis, or Parkinson disease.  What are the signs or symptoms?  Symptoms of this condition include:  · Inability to get an erection.  · Lack of enough hardness of the erection to allow penetration.  · Loss of the erection before sex is finished.  · Normal erections at some times, but with frequent unsatisfactory episodes.  · Low sexual satisfaction in either partner due to erection problems.  · A curved penis occurring with erection. The curve may cause pain or the penis may be too curved to allow for intercourse.  · Never having nighttime erections.  How is this diagnosed?  This condition is often diagnosed by:  · Performing a physical exam to find other diseases or specific problems with the penis.  · Asking you detailed questions about the problem.  · Performing blood tests to check for diabetes mellitus or to measure hormone levels.  · Performing other tests to check for underlying health conditions.  · Performing an ultrasound  exam to check for scarring.  · Performing a test to check blood flow to the penis.  · Doing a sleep study at home to measure nighttime erections.  How is this treated?  This condition may be treated by:  · Medicine taken by mouth to help you achieve an erection (oral medicine).  · Hormone replacement therapy to replace low testosterone levels.  · Medicine that is injected into the penis. Your health care provider may instruct you how to give yourself these injections at home.  · Vacuum pump. This is a pump with a ring on it. The pump and ring are placed on the penis and used to create pressure that helps the penis become erect.  · Penile implant surgery. In this procedure, you may receive:  ? An inflatable implant. This consists of cylinders, a pump, and a reservoir. The cylinders can be inflated with a fluid that helps to create an erection, and they can be deflated after intercourse.  ? A semi-rigid implant. This consists of two silicone rubber rods. The rods provide some rigidity. They are also flexible, so the penis can both curve downward in its normal position and become straight for sexual intercourse.  · Blood vessel surgery, to improve blood flow to the penis. During this procedure, a blood vessel from a different part of the body is placed into the penis to allow blood to flow around (bypass) damaged or blocked blood vessels.  · Lifestyle changes, such as exercising more, losing weight, and quitting smoking.  Follow these instructions at home:  Medicines    · Take over-the-counter and prescription medicines only as told by your health care provider. Do not increase the dosage without first discussing it with your health care provider.  · If you are using self-injections, perform injections as directed by your health care provider. Make sure to avoid any veins that are on the surface of the penis. After giving an injection, apply pressure to the injection site for 5 minutes.  General  instructions  · Exercise regularly, as directed by your health care provider. Work with your health care provider to lose weight, if needed.  · Do not use any products that contain nicotine or tobacco, such as cigarettes and e-cigarettes. If you need help quitting, ask your health care provider.  · Before using a vacuum pump, read the instructions that come with the pump and discuss any questions with your health care provider.  · Keep all follow-up visits as told by your health care provider. This is important.  Contact a health care provider if:  · You feel nauseous.  · You vomit.  Get help right away if:  · You are taking oral or injectable medicines and you have an erection that lasts longer than 4 hours. If your health care provider is unavailable, go to the nearest emergency room for evaluation. An erection that lasts much longer than 4 hours can result in permanent damage to your penis.  · You have severe pain in your groin or abdomen.  · You develop redness or severe swelling of your penis.  · You have redness spreading up into your groin or lower abdomen.  · You are unable to urinate.  · You experience chest pain or a rapid heart beat (palpitations) after taking oral medicines.  Summary  · Erectile dysfunction (ED) is the inability to get or keep an erection during sexual intercourse. This problem can usually be treated successfully.  · This condition is diagnosed based on a physical exam, your symptoms, and tests to determine the cause. Treatment varies depending on the cause, and may include medicines, hormone therapy, surgery, or vacuum pump.  · You may need follow-up visits to make sure that you are using your medicines or devices correctly.  · Get help right away if you are taking or injecting medicines and you have an erection that lasts longer than 4 hours.  This information is not intended to replace advice given to you by your health care provider. Make sure you discuss any questions you have with  your health care provider.  Document Released: 12/15/2001 Document Revised: 01/03/2018 Document Reviewed: 01/03/2018  [employer] Interactive Patient Education © 2020 [employer]  Tadalafil tablets (Cialis)  What is this medicine?  TADALAFIL (corrine DA la hoda) is used to treat erection problems in men. It is also used for enlargement of the prostate gland in men, a condition called benign prostatic hyperplasia or BPH. This medicine improves urine flow and reduces BPH symptoms. This medicine can also treat both erection problems and BPH when they occur together.  This medicine may be used for other purposes; ask your health care provider or pharmacist if you have questions.  COMMON BRAND NAME(S): Adcirca, ALYQ, Cialis  What should I tell my health care provider before I take this medicine?  They need to know if you have any of these conditions:  -bleeding disorders  -eye or vision problems, including a rare inherited eye disease called retinitis pigmentosa  -anatomical deformation of the penis, Peyronie's disease, or history of priapism (painful and prolonged erection)  -heart disease, angina, a history of heart attack, irregular heart beats, or other heart problems  -high or low blood pressure  -history of blood diseases, like sickle cell anemia or leukemia  -history of stomach bleeding  -kidney disease  -liver disease  -stroke  -an unusual or allergic reaction to tadalafil, other medicines, foods, dyes, or preservatives  -pregnant or trying to get pregnant  -breast-feeding  How should I use this medicine?  Take this medicine by mouth with a glass of water. Follow the directions on the prescription label. You may take this medicine with or without meals. When this medicine is used for erection problems, your doctor may prescribe it to be taken once daily or as needed. If you are taking the medicine as needed, you may be able to have sexual activity 30 minutes after taking it and for up to 36 hours after taking it.  Whether you are taking the medicine as needed or once daily, you should not take more than one dose per day. If you are taking this medicine for symptoms of benign prostatic hyperplasia (BPH) or to treat both BPH and an erection problem, take the dose once daily at about the same time each day. Do not take your medicine more often than directed.  Talk to your pediatrician regarding the use of this medicine in children. Special care may be needed.  Overdosage: If you think you have taken too much of this medicine contact a poison control center or emergency room at once.  NOTE: This medicine is only for you. Do not share this medicine with others.  What if I miss a dose?  If you are taking this medicine as needed for erection problems, this does not apply. If you miss a dose while taking this medicine once daily for an erection problem, benign prostatic hyperplasia, or both, take it as soon as you remember, but do not take more than one dose per day.  What may interact with this medicine?  Do not take this medicine with any of the following medications:  -nitrates like amyl nitrite, isosorbide dinitrate, isosorbide mononitrate, nitroglycerin  -other medicines for erectile dysfunction like avanafil, sildenafil, vardenafil  -other tadalafil products (Adcirca)  -riociguat  This medicine may also interact with the following medications:  -certain drugs for high blood pressure  -certain drugs for the treatment of HIV infection or AIDS  -certain drugs used for fungal or yeast infections, like fluconazole, itraconazole, ketoconazole, and voriconazole  -certain drugs used for seizures like carbamazepine, phenytoin, and phenobarbital  -grapefruit juice  -macrolide antibiotics like clarithromycin, erythromycin, troleandomycin  -medicines for prostate problems  -rifabutin, rifampin or rifapentine  This list may not describe all possible interactions. Give your health care provider a list of all the medicines, herbs,  non-prescription drugs, or dietary supplements you use. Also tell them if you smoke, drink alcohol, or use illegal drugs. Some items may interact with your medicine.  What should I watch for while using this medicine?  If you notice any changes in your vision while taking this drug, call your doctor or health care professional as soon as possible. Stop using this medicine and call your health care provider right away if you have a loss of sight in one or both eyes.  Contact your doctor or health care professional right away if the erection lasts longer than 4 hours or if it becomes painful. This may be a sign of serious problem and must be treated right away to prevent permanent damage.  If you experience symptoms of nausea, dizziness, chest pain or arm pain upon initiation of sexual activity after taking this medicine, you should refrain from further activity and call your doctor or health care professional as soon as possible.  Do not drink alcohol to excess (examples, 5 glasses of wine or 5 shots of whiskey) when taking this medicine. When taken in excess, alcohol can increase your chances of getting a headache or getting dizzy, increasing your heart rate or lowering your blood pressure.  Using this medicine does not protect you or your partner against HIV infection (the virus that causes AIDS) or other sexually transmitted diseases.  What side effects may I notice from receiving this medicine?  Side effects that you should report to your doctor or health care professional as soon as possible:  -allergic reactions like skin rash, itching or hives, swelling of the face, lips, or tongue  -breathing problems  -changes in hearing  -changes in vision  -chest pain  -fast, irregular heartbeat  -prolonged or painful erection  -seizures  Side effects that usually do not require medical attention (report to your doctor or health care professional if they continue or are bothersome):  -back  pain  -dizziness  -flushing  -headache  -indigestion  -muscle aches  -nausea  -stuffy or runny nose  This list may not describe all possible side effects. Call your doctor for medical advice about side effects. You may report side effects to FDA at 8-625-FDA-4304.  Where should I keep my medicine?  Keep out of the reach of children.  Store at room temperature between 15 and 30 degrees C (59 and 86 degrees F). Throw away any unused medicine after the expiration date.  NOTE: This sheet is a summary. It may not cover all possible information. If you have questions about this medicine, talk to your doctor, pharmacist, or health care provider.  © 2020 [employer] (2015-05-08 13:15:49)  Testosterone injection  What is this medicine?  TESTOSTERONE (nehal TOS ter one) is the main male hormone. It supports normal male development such as muscle growth, facial hair, and deep voice. It is used in males to treat low testosterone levels.  This medicine may be used for other purposes; ask your health care provider or pharmacist if you have questions.  COMMON BRAND NAME(S): Abraham-L.A., Aveed, Delatestryl, Depo-Testosterone, Virilon  What should I tell my health care provider before I take this medicine?  They need to know if you have any of these conditions:  -cancer  -diabetes  -heart disease  -kidney disease  -liver disease  -lung disease  -prostate disease  -an unusual or allergic reaction to testosterone, other medicines, foods, dyes, or preservatives  -pregnant or trying to get pregnant  -breast-feeding  How should I use this medicine?  This medicine is for injection into a muscle. It is usually given by a health care professional in a hospital or clinic setting.  Contact your pediatrician regarding the use of this medicine in children. While this medicine may be prescribed for children as young as 12 years of age for selected conditions, precautions do apply.  Overdosage: If you think you have taken too much of this  medicine contact a poison control center or emergency room at once.  NOTE: This medicine is only for you. Do not share this medicine with others.  What if I miss a dose?  Try not to miss a dose. Your doctor or health care professional will tell you when your next injection is due. Notify the office if you are unable to keep an appointment.  What may interact with this medicine?  -medicines for diabetes  -medicines that treat or prevent blood clots like warfarin  -oxyphenbutazone  -propranolol  -steroid medicines like prednisone or cortisone  This list may not describe all possible interactions. Give your health care provider a list of all the medicines, herbs, non-prescription drugs, or dietary supplements you use. Also tell them if you smoke, drink alcohol, or use illegal drugs. Some items may interact with your medicine.  What should I watch for while using this medicine?  Visit your doctor or health care professional for regular checks on your progress. They will need to check the level of testosterone in your blood.  This medicine is only approved for use in men who have low levels of testosterone related to certain medical conditions. Heart attacks and strokes have been reported with the use of this medicine. Notify your doctor or health care professional and seek emergency treatment if you develop breathing problems; changes in vision; confusion; chest pain or chest tightness; sudden arm pain; severe, sudden headache; trouble speaking or understanding; sudden numbness or weakness of the face, arm or leg; loss of balance or coordination. Talk to your doctor about the risks and benefits of this medicine.  This medicine may affect blood sugar levels. If you have diabetes, check with your doctor or health care professional before you change your diet or the dose of your diabetic medicine.  Testosterone injections are not commonly used in women. Women should inform their doctor if they wish to become pregnant or  think they might be pregnant. There is a potential for serious side effects to an unborn child. Talk to your health care professional or pharmacist for more information. Talk with your doctor or health care professional about your birth control options while taking this medicine.  This drug is banned from use in athletes by most athletic organizations.  What side effects may I notice from receiving this medicine?  Side effects that you should report to your doctor or health care professional as soon as possible:  -allergic reactions like skin rash, itching or hives, swelling of the face, lips, or tongue  -breast enlargement  -breathing problems  -changes in emotions or moods  -deep or hoarse voice  -irregular menstrual periods  -signs and symptoms of liver injury like dark yellow or brown urine; general ill feeling or flu-like symptoms; light-colored stools; loss of appetite; nausea; right upper belly pain; unusually weak or tired; yellowing of the eyes or skin  -stomach pain  -swelling of the ankles, feet, hands  -too frequent or persistent erections  -trouble passing urine or change in the amount of urine  Side effects that usually do not require medical attention (report to your doctor or health care professional if they continue or are bothersome):  -acne  -change in sex drive or performance  -facial hair growth  -hair loss  -headache  This list may not describe all possible side effects. Call your doctor for medical advice about side effects. You may report side effects to FDA at 0-224-FDA-2740.  Where should I keep my medicine?  Keep out of the reach of children. This medicine can be abused. Keep your medicine in a safe place to protect it from theft. Do not share this medicine with anyone. Selling or giving away this medicine is dangerous and against the law.  Store at room temperature between 20 and 25 degrees C (68 and 77 degrees F). Do not freeze. Protect from light. Follow the directions for the product  you are prescribed. Throw away any unused medicine after the expiration date.  NOTE: This sheet is a summary. It may not cover all possible information. If you have questions about this medicine, talk to your doctor, pharmacist, or health care provider.  © 2020 [employer] Standard (2017-01-21 07:33:55)

## 2020-05-19 LAB
DEPRECATED RDW RBC AUTO: 37.5 FL (ref 37–54)
ERYTHROCYTE [DISTWIDTH] IN BLOOD BY AUTOMATED COUNT: 11.8 % (ref 12.3–15.4)
HCT VFR BLD AUTO: 44.7 % (ref 37.5–51)
HGB BLD-MCNC: 15.2 G/DL (ref 13–17.7)
MCH RBC QN AUTO: 29.5 PG (ref 26.6–33)
MCHC RBC AUTO-ENTMCNC: 34 G/DL (ref 31.5–35.7)
MCV RBC AUTO: 86.8 FL (ref 79–97)
PLATELET # BLD AUTO: 248 10*3/MM3 (ref 140–450)
PMV BLD AUTO: 10 FL (ref 6–12)
PSA SERPL-MCNC: 0.49 NG/ML (ref 0–4)
RBC # BLD AUTO: 5.15 10*6/MM3 (ref 4.14–5.8)
WBC NRBC COR # BLD: 9.66 10*3/MM3 (ref 3.4–10.8)

## 2020-05-28 ENCOUNTER — PATIENT MESSAGE (OUTPATIENT)
Dept: UROLOGY | Facility: CLINIC | Age: 40
End: 2020-05-28

## 2020-05-28 DIAGNOSIS — R79.89 LOW TESTOSTERONE: ICD-10-CM

## 2020-05-28 NOTE — TELEPHONE ENCOUNTER
From: Lavelle Cabral  To: Cheng-Akwa, Griselda, APRN  Sent: 5/28/2020 2:35 PM EDT  Subject: Prescription Question    Hello. My local [facility] and [facility] are out of needles for me to administer the testosterone. [facility] said that they have them but I'd need a prescription for them. Could you send a script to them please! Size 21-23 preferred. Thanks    Lavelle Cabral  ([facility] [phone])

## 2020-06-08 ENCOUNTER — PATIENT MESSAGE (OUTPATIENT)
Dept: UROLOGY | Facility: CLINIC | Age: 40
End: 2020-06-08

## 2020-06-09 ENCOUNTER — PRIOR AUTHORIZATION (OUTPATIENT)
Dept: UROLOGY | Facility: CLINIC | Age: 40
End: 2020-06-09

## 2020-06-12 ENCOUNTER — TELEPHONE (OUTPATIENT)
Dept: GASTROENTEROLOGY | Facility: CLINIC | Age: 40
End: 2020-06-12

## 2020-06-15 ENCOUNTER — PATIENT MESSAGE (OUTPATIENT)
Dept: NEUROSURGERY | Facility: CLINIC | Age: 40
End: 2020-06-15

## 2020-06-15 DIAGNOSIS — M54.16 RADICULOPATHY OF LUMBAR REGION: ICD-10-CM

## 2020-06-15 RX ORDER — METHOCARBAMOL 500 MG/1
500 TABLET, FILM COATED ORAL 3 TIMES DAILY PRN
Qty: 90 TABLET | Refills: 0 | Status: SHIPPED | OUTPATIENT
Start: 2020-06-15 | End: 2020-07-20 | Stop reason: SDUPTHER

## 2020-06-15 RX ORDER — GABAPENTIN 400 MG/1
400 CAPSULE ORAL 3 TIMES DAILY
Qty: 90 CAPSULE | Refills: 0 | OUTPATIENT
Start: 2020-06-15 | End: 2020-07-20 | Stop reason: SDUPTHER

## 2020-06-15 NOTE — TELEPHONE ENCOUNTER
Provider:  Finn  Caller:  Automated refill request  Surgery:  NA  Surgery Date:    Last visit:  01/06/20  Next visit: NA    Reason for call:         Requested Prescriptions     Pending Prescriptions Disp Refills   • methocarbamol (Robaxin) 500 MG tablet 90 tablet 0     Sig: Take 1 tablet by mouth 3 (Three) Times a Day As Needed for Muscle Spasms. Please cancel 750mg TID

## 2020-06-15 NOTE — TELEPHONE ENCOUNTER
Provider:  Finn  Caller:  Automated refill request  Surgery:  NA  Surgery Date:    Last visit:  01/06/20  Next visit: NA     AMRITA:  05/19/2020 Gabapentin 400MG 1978 90 30 Yazan Briseno [facility] 2    04/20/2020 Gabapentin 400MG 1978 90 30 Yazan Briseno [facility] 2    03/24/2020 Gabapentin 400MG 1978 90 30 Yazan Briseno [facility] 2    Reason for call:  Requested Prescriptions     Pending Prescriptions Disp Refills   • gabapentin (NEURONTIN) 400 MG capsule 90 capsule 0     Sig: Take 1 capsule by mouth 3 (Three) Times a Day.

## 2020-06-15 NOTE — TELEPHONE ENCOUNTER
Pt was told on 5/15/2020 that further refills will need to come from PCP.     Per pt: My dr isn't able to write a script for this medicine. Please refill it and I can come for  a visit either June 22 or 25th at any time of the day.

## 2020-07-20 DIAGNOSIS — M54.16 RADICULOPATHY OF LUMBAR REGION: ICD-10-CM

## 2020-07-20 RX ORDER — GABAPENTIN 400 MG/1
400 CAPSULE ORAL 3 TIMES DAILY
Qty: 90 CAPSULE | Refills: 1 | OUTPATIENT
Start: 2020-07-20 | End: 2020-08-12 | Stop reason: DRUGHIGH

## 2020-07-20 RX ORDER — METHOCARBAMOL 500 MG/1
500 TABLET, FILM COATED ORAL 3 TIMES DAILY PRN
Qty: 90 TABLET | Refills: 0 | Status: SHIPPED | OUTPATIENT
Start: 2020-07-20

## 2020-07-20 NOTE — TELEPHONE ENCOUNTER
Provider:  Finn  Caller:   Time of call:     Phone #:  [phone]  Surgery:  no  Surgery Date:    Last visit:   01/06/20  Next visit: 08/03/20    AMRITA:  04/20/2020 Gabapentin 400MG 1978 90 30 Reynaldo Brisenoian [facility] 2  04/23/2020 Buprenorphine Hcl/Naloxon 8MG/2MG 1978 14 7 [facility]  [place] 1  04/30/2020 Buprenorphine Hcl/Naloxon 8MG/2MG 1978 14 7 [facility]  [place] 1  05/07/2020 Buprenorphine Hcl/Naloxon 8MG/2MG 1978 28 14 [facility]  [place] 1  05/18/2020 Testosterone Cypionate  200MG/ML/9.45MG/ML/0.2ML/  1978 4 28 [facility]  [place] 1  05/19/2020 Gabapentin 400MG 1978 90 30 Yazan Briseno [facility] 2           Reason for call:     Pt requests refill on Gabapentin and Robaxin.

## 2020-08-12 DIAGNOSIS — M54.16 RADICULOPATHY OF LUMBAR REGION: ICD-10-CM

## 2020-08-12 RX ORDER — GABAPENTIN 400 MG/1
400 CAPSULE ORAL 3 TIMES DAILY
Qty: 90 CAPSULE | Refills: 1 | Status: CANCELLED | OUTPATIENT
Start: 2020-08-12

## 2020-08-12 RX ORDER — GABAPENTIN 600 MG/1
600 TABLET ORAL 3 TIMES DAILY
Qty: 90 TABLET | Refills: 0 | Status: SHIPPED | OUTPATIENT
Start: 2020-08-12 | End: 2020-09-08 | Stop reason: SDUPTHER

## 2020-08-12 NOTE — TELEPHONE ENCOUNTER
Per pt: Patient Comment: I discussed with the doctor about a week ago the need for more medicine, especially at bedtime. Can you please either upgrade my gabapentin to either 600mg 3xdaily or 400mg 4xdaily   Thanks!     AMRITA:  07/21/2020 Gabapentin 400MG 1978 90 30 KAVYA [facility] [place] 2  06/15/2020 Gabapentin 400MG 1978 90 30 HOFF [facility] [place] 2  05/19/2020 Gabapentin 400MG 1978 90 30 HOFF [facility] [place] 2

## 2020-09-01 ENCOUNTER — TELEPHONE (OUTPATIENT)
Dept: NEUROSURGERY | Facility: CLINIC | Age: 40
End: 2020-09-01

## 2020-09-01 NOTE — TELEPHONE ENCOUNTER
KYRA FROM [facility] CALLED STATING THAT THE PATIENT IS HAVING AN MRI ON 9/9/2020 @ [facility].    IF YOU HAVE ANY QUESTIONS, PLEASE CALL KYRA @ [phone] ext [phone]

## 2020-09-08 DIAGNOSIS — M54.16 RADICULOPATHY OF LUMBAR REGION: ICD-10-CM

## 2020-09-08 RX ORDER — GABAPENTIN 600 MG/1
600 TABLET ORAL 3 TIMES DAILY
Qty: 90 TABLET | Refills: 0 | Status: SHIPPED | OUTPATIENT
Start: 2020-09-08 | End: 2020-10-05 | Stop reason: SDUPTHER

## 2020-09-08 NOTE — TELEPHONE ENCOUNTER
Provider:  Finn  Caller:  Automated refill request  Surgery:  NA  Surgery Date:    Last visit:  01/06/20  Next visit: 09/24/20    Reason for call:         Requested Prescriptions     Pending Prescriptions Disp Refills   • gabapentin (NEURONTIN) 600 MG tablet 90 tablet 0     Sig: Take 1 tablet by mouth 3 (Three) Times a Day.     Moisés:     07/21/2020 Gabapentin 400MG 1978 90 30 FINN [facility] 3  08/05/2020 Testosterone Cypionate  200MG/ML/9.45MG/ML/0.2ML/  1978 4 28 CHEIKH POOLE  [facility] [phone]  [facility] 4  08/13/2020 Gabapentin 600MG 1978 90 30 FINN CARDOS[facility] 3  08/17/2020 Buprenorphine Hcl/Naloxon 8MG/2MG 1978 14 7 FERNANDO [place] [facility] 1  08/20/2020 Buprenorphine Hcl/Naloxon 8MG/2MG 1978 14 7 FERNANDO [place] [facility] 1  08/29/2020 Buprenorphine Hcl/Naloxon 8MG/2MG 1978 16 8 FERNANDO [place] [facility] 1

## 2020-09-24 ENCOUNTER — APPOINTMENT (OUTPATIENT)
Dept: MRI IMAGING | Facility: HOSPITAL | Age: 40
End: 2020-09-24

## 2020-10-05 DIAGNOSIS — M54.16 RADICULOPATHY OF LUMBAR REGION: ICD-10-CM

## 2020-10-05 RX ORDER — GABAPENTIN 600 MG/1
600 TABLET ORAL 3 TIMES DAILY
Qty: 90 TABLET | Refills: 0 | OUTPATIENT
Start: 2020-10-05

## 2020-10-05 NOTE — TELEPHONE ENCOUNTER
AMRITA:  9/09/2020 Gabapentin 600MG 1978 90 30 YADY DEY [place] [facility] [place] 30  08/13/2020 Gabapentin 600MG 1978 90 30 KAVYA CARDOSO [facility] [place] 3  07/21/2020 Gabapentin 400MG 1978 90 30 KAVYA CARDOSO [place] [facility] [place] 3

## 2020-10-14 DIAGNOSIS — R79.89 LOW TESTOSTERONE: ICD-10-CM

## 2020-10-15 RX ORDER — TADALAFIL 5 MG/1
5 TABLET ORAL DAILY PRN
Qty: 30 TABLET | Refills: 6 | Status: SHIPPED | OUTPATIENT
Start: 2020-10-15 | End: 2020-12-31 | Stop reason: SDUPTHER

## 2020-11-05 ENCOUNTER — TELEPHONE (OUTPATIENT)
Dept: NEUROSURGERY | Facility: CLINIC | Age: 40
End: 2020-11-05

## 2020-11-05 NOTE — TELEPHONE ENCOUNTER
THE PATIENT CALLED BECAUSE HE IS CURRENTLY OUT OF GABAPENTIN. HE TOOK THE LAST PILL LAST NIGHT. HE SUBMITTED A REFILL REQUEST THROUGH [employer] YESTERDAY AND STATED THAT IN THE PAST HIS REFILLS WERE USUALLY APPROVED WITHIN A FEW HOURS. HE WAS ADVISED THAT THERE COULD BE A DELAY OF UP TO 72 HOURS FOR THE PRESCRIBER TO SIGN OFF ON THE REFILL, AND HE WANTED TO KNOW IF THERE IS ANYTHING HE COULD DO TO HAVE THE REQUEST PROCESSED FASTER. HE IS VERY WORRIED THAT HE WON'T BE ABLE TO GET THE REFILL BEFORE THE WEEKEND.    THE PATIENT CAN BE REACHED [phone]    THANK YOU!

## 2020-11-05 NOTE — TELEPHONE ENCOUNTER
I do not see a refill request. He has been told in the past that he needed to get ongoing refills from PCP. He has no showed his last three office visits with Aleksandr. It appears MRI has been ordered but no f/u with Aleksandr scheduled. He can have 1 month, last refill unless schedule up followed.

## 2020-12-08 DIAGNOSIS — M54.16 RADICULOPATHY OF LUMBAR REGION: ICD-10-CM

## 2020-12-09 DIAGNOSIS — M54.16 RADICULOPATHY OF LUMBAR REGION: ICD-10-CM

## 2020-12-09 RX ORDER — METHOCARBAMOL 500 MG/1
500 TABLET, FILM COATED ORAL 3 TIMES DAILY PRN
Qty: 90 TABLET | Refills: 0 | OUTPATIENT
Start: 2020-12-09

## 2020-12-09 RX ORDER — GABAPENTIN 600 MG/1
600 TABLET ORAL 3 TIMES DAILY
Qty: 90 TABLET | Refills: 0 | OUTPATIENT
Start: 2020-12-09

## 2020-12-09 RX ORDER — GABAPENTIN 600 MG/1
600 TABLET ORAL 3 TIMES DAILY
Qty: 90 TABLET | Refills: 0 | Status: CANCELLED | OUTPATIENT
Start: 2020-12-09

## 2020-12-09 NOTE — TELEPHONE ENCOUNTER
See previous refill requests.  Patient has no showed the last 3 appts.  The last refill request he should have been told it was a final refill and additional need to come from PCP.

## 2020-12-09 NOTE — TELEPHONE ENCOUNTER
Provider:  Kavya  Caller:  Automated refill request  Surgery:  NA  Surgery Date:    Last visit:  01/06/20  Next visit: NA    Reason for call:         Requested Prescriptions     Pending Prescriptions Disp Refills   • methocarbamol (Robaxin) 500 MG tablet 90 tablet 0     Sig: Take 1 tablet by mouth 3 (Three) Times a Day As Needed for Muscle Spasms. Please cancel 750mg TID   • gabapentin (NEURONTIN) 600 MG tablet 90 tablet 0     Sig: Take 1 tablet by mouth 3 (Three) Times a Day.     Moisés:     11/05/2020 Gabapentin 600MG 1980 90 30 KAVYA CARDOSO [facility] 4  11/09/2020 Buprenorphine Hcl/Naloxon 8MG/2MG 1980 12 6 [facility]  11/13/2020 Buprenorphine Hcl/Naloxon 8MG/2MG 1980 14 7 [facility]  11/20/2020 Buprenorphine Hcl/Naloxon 8MG/2MG 1980 60 30 FERNANDO RAMOS [facility] 2

## 2020-12-10 ENCOUNTER — TELEPHONE (OUTPATIENT)
Dept: UROLOGY | Facility: CLINIC | Age: 40
End: 2020-12-10

## 2020-12-10 NOTE — TELEPHONE ENCOUNTER
Pt is out of his testosterone and can not come in for a visit due to being positive for covid.  He would like to know if he can have a refill called in to do until he can come in the office

## 2020-12-10 NOTE — TELEPHONE ENCOUNTER
LM - we can not refill his testosterone until he is able to come in for an appt or do a telephone encounter and get the labs once he is no longer covid positive.

## 2020-12-11 ENCOUNTER — PATIENT MESSAGE (OUTPATIENT)
Dept: NEUROSURGERY | Facility: CLINIC | Age: 40
End: 2020-12-11

## 2020-12-11 ENCOUNTER — OFFICE VISIT (OUTPATIENT)
Dept: UROLOGY | Facility: CLINIC | Age: 40
End: 2020-12-11

## 2020-12-11 VITALS — WEIGHT: 225 LBS | BODY MASS INDEX: 35.31 KG/M2 | HEIGHT: 67 IN

## 2020-12-11 DIAGNOSIS — E29.1 HYPOGONADISM IN MALE: Primary | ICD-10-CM

## 2020-12-11 DIAGNOSIS — R79.89 LOW TESTOSTERONE: ICD-10-CM

## 2020-12-11 PROCEDURE — 99442 PR PHYS/QHP TELEPHONE EVALUATION 11-20 MIN: CPT | Performed by: NURSE PRACTITIONER

## 2020-12-11 RX ORDER — HYDROCHLOROTHIAZIDE 25 MG/1
25 TABLET ORAL 2 TIMES DAILY
COMMUNITY
Start: 2020-10-30

## 2020-12-11 RX ORDER — EMTRICITABINE AND TENOFOVIR DISOPROXIL FUMARATE 200; 300 MG/1; MG/1
1 TABLET, FILM COATED ORAL DAILY
COMMUNITY
Start: 2020-09-10

## 2020-12-11 RX ORDER — LAMOTRIGINE 25 MG/1
25 TABLET ORAL 2 TIMES DAILY
COMMUNITY
Start: 2020-11-09 | End: 2020-12-11

## 2020-12-11 RX ORDER — CLONIDINE HYDROCHLORIDE 0.1 MG/1
0.1 TABLET ORAL AS NEEDED
COMMUNITY

## 2020-12-11 RX ORDER — HYDROXYZINE HYDROCHLORIDE 25 MG/1
TABLET, FILM COATED ORAL
COMMUNITY
Start: 2020-11-02 | End: 2020-12-11

## 2020-12-11 RX ORDER — CLOBETASOL PROPIONATE 0.5 MG/G
CREAM TOPICAL
COMMUNITY
Start: 2020-11-17

## 2020-12-11 RX ORDER — TRETINOIN 1 MG/G
CREAM TOPICAL
COMMUNITY
Start: 2020-10-22 | End: 2020-12-11

## 2020-12-11 RX ORDER — POTASSIUM CHLORIDE 750 MG/1
10 TABLET, FILM COATED, EXTENDED RELEASE ORAL DAILY
COMMUNITY
Start: 2020-11-07

## 2020-12-11 RX ORDER — BUPRENORPHINE HYDROCHLORIDE AND NALOXONE HYDROCHLORIDE DIHYDRATE 8; 2 MG/1; MG/1
TABLET SUBLINGUAL
COMMUNITY
Start: 2020-09-18

## 2020-12-11 RX ORDER — PERMETHRIN 50 MG/G
CREAM TOPICAL
COMMUNITY
Start: 2020-11-10 | End: 2020-12-11

## 2020-12-11 RX ORDER — VALSARTAN 160 MG/1
160 TABLET ORAL 2 TIMES DAILY
COMMUNITY
Start: 2020-10-27

## 2020-12-11 RX ORDER — BUDESONIDE AND FORMOTEROL FUMARATE DIHYDRATE 160; 4.5 UG/1; UG/1
AEROSOL RESPIRATORY (INHALATION)
COMMUNITY
Start: 2020-11-05

## 2020-12-11 RX ORDER — NALOXONE HYDROCHLORIDE 4 MG/.1ML
SPRAY NASAL
COMMUNITY
Start: 2020-10-12

## 2020-12-11 NOTE — TELEPHONE ENCOUNTER
Provider:  Finn  Caller:  Automated refill request  Surgery:  Na  Surgery Date:    Last visit:  01/06/20 (seen 1 x; pt has cx 3 - 4 x since)  Next visit: NA    Reason for call:         Requested Prescriptions     Pending Prescriptions Disp Refills   • gabapentin (NEURONTIN) 600 MG tablet 90 tablet 0     Sig: Take 1 tablet by mouth 3 (Three) Times a Day.     FROM PREVIOUS TELEPHONE ENCOUNTER     Millicent Posey PA-C   12/08/20    12:36 PM  Note     See previous refill requests.  Patient has no showed the last 3 appts.  The last refill request he should have been told it was a final refill and additional need to come from PCP.             I sent pt a myChart msg explaining that refills need to come from PCP.   NK

## 2020-12-11 NOTE — PROGRESS NOTES
"Chief Complaint:          HYPOGONADISM/LOW TESTOSTERONE    HPI:   40 y.o. male.  You have chosen to receive care through a telephone visit. Do you consent to use a telephone visit for your medical care today? Yes    Patient evaluated via telemedicine due to Covid exposure and currently on quarantine. He has been on testosterone replacement therapy. This is his six month follow up.     He is past due for appropriate lab monitoring regarding this.  He understands this is a controlled substance and therefore must be watched closely. He knows his medications  will not be refilled  In case of any reported  medication  loss or miss calculation of the dose.  He is very happy with the treatment and therefore wants to continue it.    He reports a dramatic improvement in his Maksim questionnaire: -MAKSIM-androgen deficiency in the age male questionnaire. The patient was queried regarding the androgen deficiency in the age male questionnaire.  This is a validated questionnaire that was performed on a set of 314 [place] male physicians when it was positive it correlated directly with a 94% chance of low testosterone.  Patient indicates there is a decrease in libido or sex drive, a lack of energy, Decreased  strength and endurance, a decreased \"enjoyment of life\", sad and grumpy feelings with significant difficulty maintaining erections. They also will report a recent deterioration regarding their  work performance.    He reports some weight loss.  But unsure how many pounds. He has a relatively  good facility and  Has previously demonstrated a great use of subcutaneous and intramuscular injections as well as comfort level and using the medication in a sterile fashion.  He understands he should use only the prescribed dose.    Patient has been informed his Testosterone would not be refilled until his labs have been completed.  He is agreeable to getting his labs done as soon as possible.    Past Medical History:        Past Medical " "History:   Diagnosis Date   • Anxiety    • Depression    • Hypertension    • Neck injury      Current Meds:     Current Outpatient Medications   Medication Sig Dispense Refill   • budesonide-formoterol (SYMBICORT) 160-4.5 MCG/ACT inhaler      • buprenorphine-naloxone (SUBOXONE) 8-2 MG per SL tablet      • clobetasol (TEMOVATE) 0.05 % cream APPLY TO SMALL AREA OF RASH TWICE A DAY FOR 2 WEEKS     • cloNIDine (CATAPRES) 0.1 MG tablet Take 0.1 mg by mouth As Needed for High Blood Pressure.     • DULoxetine (CYMBALTA) 30 MG capsule Take 30 mg by mouth 2 (Two) Times a Day.     • gabapentin (NEURONTIN) 600 MG tablet Take 1 tablet by mouth 3 (Three) Times a Day. 90 tablet 0   • hydroCHLOROthiazide (HYDRODIURIL) 25 MG tablet Take 25 mg by mouth 2 (Two) Times a Day.     • methocarbamol (Robaxin) 500 MG tablet Take 1 tablet by mouth 3 (Three) Times a Day As Needed for Muscle Spasms. Please cancel 750mg TID 90 tablet 0   • metoprolol tartrate (LOPRESSOR) 25 MG tablet      • Narcan 4 MG/0.1ML nasal spray      • potassium chloride 10 MEQ CR tablet Take 10 mEq by mouth Daily. with food     • Syringe 25G X 5/8\" 3 ML misc Use as directed 2 x weekly 24 each 3   • tadalafil (Cialis) 5 MG tablet Take 1 tablet by mouth Daily As Needed for Erectile Dysfunction. Take one Daily 30 tablet 6   • Testosterone Cypionate (Depo-Testosterone) 200 MG/ML injection Inject 1/2 cc subcutaneously every Monday and Thursday 10 mL 2   • Truvada 200-300 MG per tablet Take 1 tablet by mouth Daily.     • valsartan (DIOVAN) 160 MG tablet Take 160 mg by mouth 2 (Two) Times a Day.       No current facility-administered medications for this visit.         Allergies:      Allergies   Allergen Reactions   • Ciprofloxacin Swelling   • Ultram [Tramadol Hcl] Other (See Comments)     Single seizure. Due to taking Tramadol & Cymbalta in combination. -Per pt.    • Amoxicillin Rash   • Mustard [Allyl Isothiocyanate] Rash        Past Surgical History:     Past Surgical " History:   Procedure Laterality Date   • APPENDECTOMY N/A 1/10/2017    Procedure: APPENDECTOMY LAPAROSCOPIC;  Surgeon: Nicholas Gifford MD;  Location: [facility];  Service:    • APPENDECTOMY     • HERNIA REPAIR     • KNEE ACL RECONSTRUCTION           Social History:     Social History     Socioeconomic History   • Marital status: Single     Spouse name: Not on file   • Number of children: Not on file   • Years of education: Not on file   • Highest education level: Not on file   Tobacco Use   • Smoking status: Never Smoker   • Smokeless tobacco: Never Used   Substance and Sexual Activity   • Alcohol use: Yes     Alcohol/week: 3.0 standard drinks     Types: 3 Cans of beer per week   • Drug use: No   • Sexual activity: Yes     Partners: Male   Social History Narrative    Pt is in committed 23 year relationship. Pt works at [employer]. No children.       Family History:     Family History   Problem Relation Age of Onset   • Diabetes Father    • Heart disease Father    • Heart disease Paternal Grandfather        Review of Systems:     Review of Systems   Constitutional: Positive for activity change and fatigue. Negative for chills and fever.   HENT: Negative for congestion, sinus pressure and sinus pain.    Eyes: Negative for discharge and itching.   Respiratory: Negative for apnea, cough, chest tightness and shortness of breath.    Cardiovascular: Negative for chest pain and leg swelling.   Gastrointestinal: Negative for abdominal distention, abdominal pain, nausea and vomiting.   Endocrine: Negative for cold intolerance and heat intolerance.   Genitourinary: Negative.  Negative for difficulty urinating, dysuria, flank pain, frequency, hematuria and urgency.   Musculoskeletal: Negative for back pain.   Skin: Negative for color change.   Neurological: Negative for dizziness and headaches.   Psychiatric/Behavioral: Negative for behavioral problems and confusion. The patient is not nervous/anxious.    All other systems  reviewed and are negative.      Assessment:     Encounter Diagnoses   Name Primary?   • Hypogonadism in male Yes   • Low testosterone        Orders Placed This Encounter   Procedures   • CBC (No Diff)   • PSA DIAGNOSTIC   • Testosterone   • Estradiol       Patient reports that he is not currently experiencing any symptoms of urinary incontinence.      Plan:          ASSESSMENT  Low Testosterone: Very pleasant male evaluated via telemedicine today secondary to Covid exposure and having to be on quarantine.      He has a positive Maksim questionnaire, which by history  includes both the sexual and nonsexual side effects.  Sexual side effects include inability to achieve and maintain an erection, in ability to maintain his erection and decreased interest and sexual activity.   Nonsexual symptomatology includes fatigue, difficulty completing a job, tiredness. We discussed the various forms testosterone is available including parenteral, topical, and the form of a patch. We discussed the efficacy of the gels, and the injections.  As well as the cost and benefits analysis.     He also understands this is a controlled substance and as such will not be prescribed without appropriate follow-up and appropriate laboratory investigation. We discussed the different studies and controversies surrounding testosterone injections and also talked about heart disease and its effect on prostate cancer both of which are negligible.     He understands the risks and benefits as we discussed at length. He understands the partial effects on spermatogenesis including the fact that this is not always completely reversible and not always, but can completely limit his ability to father a child.   He affirmed the fact that he has  also completed his attempts at fertility and no longer wants children at this time. He is agreeable to treatment as discussed above gives verbal consent to proceed with treatment.      PLAN  Discussed his plan with   Julissa who is agreeable to it.    Pending laboratory monitoring with PSA, CBC, ESTRADIOL, and Testosterone.    Patient will get labs and send results to the clinic prior to refills.    He will continue on Testosterone C current treatment.  200 mg/day mL(1/2CC TWICE WEEKLY)    Will see him in 6 Months for follow up.    Patient is agreeable plan of care.    Smoking Cessation Counseling:  Never a smoker.  Patient does not currently use any tobacco products.       Counseling was given to patient for the following topics diagnostic results including: lOW t IN MALE and instructions for management as follows: TESTOSTERONE INJECTIONS. The interim medical history and current results were reviewed.  A treatment plan with follow-up was made for Hypogonadism in male [E29.1]. This visit has been rescheduled as a phone visit to comply with patient safety concerns in accordance with CDC recommendations. Total time of discussion was 12 minutes with NAVI SUAREZ           This document has been electronically signed by Griselda Cheng-Akwa, APRN December 15, 2020 20:50 EST

## 2020-12-15 ENCOUNTER — TELEPHONE (OUTPATIENT)
Dept: NEUROSURGERY | Facility: CLINIC | Age: 40
End: 2020-12-15

## 2020-12-15 NOTE — TELEPHONE ENCOUNTER
I can see him in person. But it will likely be the beginning of the year... has been non surgical up to this point.     He should still get meds from PCP. We wont manage them long term

## 2020-12-15 NOTE — TELEPHONE ENCOUNTER
Patient returned call, scheduled phone visit on 1/4/21.      If refill is not provided then patient would like sooner appointment.

## 2020-12-17 RX ORDER — GABAPENTIN 600 MG/1
600 TABLET ORAL 3 TIMES DAILY
Qty: 90 TABLET | Refills: 0 | OUTPATIENT
Start: 2020-12-17

## 2020-12-17 RX ORDER — METHOCARBAMOL 500 MG/1
500 TABLET, FILM COATED ORAL 3 TIMES DAILY PRN
Qty: 60 TABLET | Refills: 0 | OUTPATIENT
Start: 2020-12-17

## 2020-12-17 NOTE — TELEPHONE ENCOUNTER
Patient called to see if he could have a refill on gabapentin and Robaxin until his appointment with a PCP on 12/29.  He was scheduled to establish care but then had COVID and missed it.    He has not been able to sleep because of the shooting pain in his legs.     He is scheduled with Aleksandr on 1/4 for phone visit.

## 2020-12-17 NOTE — TELEPHONE ENCOUNTER
Gabapentin called in to pt's pharmacy. -Pt aware VIA my chart message that we will NOT refill medications further under no circumstance.     Please sign to reflect in Epic.

## 2020-12-17 NOTE — TELEPHONE ENCOUNTER
Okay to refill 1 more time.  Please let patient know that he needs to get these from PCP henceforth

## 2020-12-18 RX ORDER — GABAPENTIN 600 MG/1
600 TABLET ORAL 3 TIMES DAILY
Qty: 90 TABLET | Refills: 0 | OUTPATIENT
Start: 2020-12-18

## 2020-12-18 RX ORDER — METHOCARBAMOL 500 MG/1
500 TABLET, FILM COATED ORAL 3 TIMES DAILY PRN
Qty: 30 TABLET | Refills: 0 | Status: SHIPPED | OUTPATIENT
Start: 2020-12-18

## 2020-12-23 ENCOUNTER — TELEPHONE (OUTPATIENT)
Dept: UROLOGY | Facility: CLINIC | Age: 40
End: 2020-12-23

## 2020-12-28 ENCOUNTER — TELEPHONE (OUTPATIENT)
Dept: UROLOGY | Facility: CLINIC | Age: 40
End: 2020-12-28

## 2020-12-28 NOTE — TELEPHONE ENCOUNTER
Patient stated that he called last week. We told him that we had received labs and were taking to the doctor. Patient is requesting refill on testosterone and call back.

## 2020-12-28 NOTE — TELEPHONE ENCOUNTER
I called the pt back and told him there were no updated labs he said he spoke with someone on Wednesday and they told him his labs were in the system and they were but not scanned into his chart. I am routing to [facility] to look at labs and escribe.

## 2020-12-31 ENCOUNTER — OFFICE VISIT (OUTPATIENT)
Dept: UROLOGY | Facility: CLINIC | Age: 40
End: 2020-12-31

## 2020-12-31 VITALS — HEIGHT: 67 IN | WEIGHT: 225 LBS | TEMPERATURE: 98.7 F | BODY MASS INDEX: 35.31 KG/M2

## 2020-12-31 DIAGNOSIS — R79.89 LOW TESTOSTERONE: ICD-10-CM

## 2020-12-31 PROCEDURE — 99213 OFFICE O/P EST LOW 20 MIN: CPT | Performed by: UROLOGY

## 2020-12-31 RX ORDER — TADALAFIL 5 MG/1
5 TABLET ORAL DAILY PRN
Qty: 30 TABLET | Refills: 6 | Status: SHIPPED | OUTPATIENT
Start: 2020-12-31 | End: 2021-04-10 | Stop reason: SDUPTHER

## 2020-12-31 RX ORDER — TESTOSTERONE CYPIONATE 200 MG/ML
INJECTION, SOLUTION INTRAMUSCULAR
Qty: 10 ML | Refills: 2 | Status: SHIPPED | OUTPATIENT
Start: 2020-12-31

## 2021-01-03 PROBLEM — R79.89 LOW TESTOSTERONE: Status: ACTIVE | Noted: 2021-01-03

## 2021-01-04 ENCOUNTER — OFFICE VISIT (OUTPATIENT)
Dept: NEUROSURGERY | Facility: CLINIC | Age: 41
End: 2021-01-04

## 2021-01-04 DIAGNOSIS — Z00.6 EXAM FOR CLINICAL RESEARCH: Primary | ICD-10-CM

## 2021-01-04 DIAGNOSIS — M48.062 SPINAL STENOSIS, LUMBAR REGION, WITH NEUROGENIC CLAUDICATION: Primary | ICD-10-CM

## 2021-01-04 PROCEDURE — 99442 PR PHYS/QHP TELEPHONE EVALUATION 11-20 MIN: CPT | Performed by: PHYSICIAN ASSISTANT

## 2021-01-04 NOTE — PATIENT INSTRUCTIONS
Obesity, Adult  Obesity is the condition of having too much total body fat. Being overweight or obese means that your weight is greater than what is considered healthy for your body size. Obesity is determined by a measurement called BMI. BMI is an estimate of body fat and is calculated from height and weight. For adults, a BMI of 30 or higher is considered obese.  Obesity can lead to other health concerns and major illnesses, including:  · Stroke.  · Coronary artery disease (CAD).  · Type 2 diabetes.  · Some types of cancer, including cancers of the colon, breast, uterus, and gallbladder.  · Osteoarthritis.  · High blood pressure (hypertension).  · High cholesterol.  · Sleep apnea.  · Gallbladder stones.  · Infertility problems.  What are the causes?  Common causes of this condition include:  · Eating daily meals that are high in calories, sugar, and fat.  · Being born with genes that may make you more likely to become obese.  · Having a medical condition that causes obesity, including:  ? Hypothyroidism.  ? Polycystic ovarian syndrome (PCOS).  ? Binge-eating disorder.  ? Cushing syndrome.  · Taking certain medicines, such as steroids, antidepressants, and seizure medicines.  · Not being physically active (sedentary lifestyle).  · Not getting enough sleep.  · Drinking high amounts of sugar-sweetened beverages, such as soft drinks.  What increases the risk?  The following factors may make you more likely to develop this condition:  · Having a family history of obesity.  · Being a woman of  descent.  · Being a man of  descent.  · Living in an area with limited access to:  ? Masters, recreation centers, or sidewalks.  ? Healthy food choices, such as grocery stores and farmers' markets.  What are the signs or symptoms?  The main sign of this condition is having too much body fat.  How is this diagnosed?  This condition is diagnosed based on:  · Your BMI. If you are an adult with a BMI of 30 or  higher, you are considered obese.  · Your waist circumference. This measures the distance around your waistline.  · Your skinfold thickness. Your health care provider may gently pinch a fold of your skin and measure it.  You may have other tests to check for underlying conditions.  How is this treated?  Treatment for this condition often includes changing your lifestyle. Treatment may include some or all of the following:  · Dietary changes. This may include developing a healthy meal plan.  · Regular physical activity. This may include activity that causes your heart to beat faster (aerobic exercise) and strength training. Work with your health care provider to design an exercise program that works for you.  · Medicine to help you lose weight if you are unable to lose 1 pound a week after 6 weeks of healthy eating and more physical activity.  · Treating conditions that cause the obesity (underlying conditions).  · Surgery. Surgical options may include gastric banding and gastric bypass. Surgery may be done if:  ? Other treatments have not helped to improve your condition.  ? You have a BMI of 40 or higher.  ? You have life-threatening health problems related to obesity.  Follow these instructions at home:  Eating and drinking    · Follow recommendations from your health care provider about what you eat and drink. Your health care provider may advise you to:  ? Limit fast food, sweets, and processed snack foods.  ? Choose low-fat options, such as low-fat milk instead of whole milk.  ? Eat 5 or more servings of fruits or vegetables every day.  ? Eat at home more often. This gives you more control over what you eat.  ? Choose healthy foods when you eat out.  ? Learn to read food labels. This will help you understand how much food is considered 1 serving.  ? Learn what a healthy serving size is.  ? Keep low-fat snacks available.  ? Limit sugary drinks, such as soda, fruit juice, sweetened iced tea, and flavored  milk.  · Drink enough water to keep your urine pale yellow.  · Do not follow a fad diet. Fad diets can be unhealthy and even dangerous.  Physical activity  · Exercise regularly, as told by your health care provider.  ? Most adults should get up to 150 minutes of moderate-intensity exercise every week.  ? Ask your health care provider what types of exercise are safe for you and how often you should exercise.  · Warm up and stretch before being active.  · Cool down and stretch after being active.  · Rest between periods of activity.  Lifestyle  · Work with your health care provider and a dietitian to set a weight-loss goal that is healthy and reasonable for you.  · Limit your screen time.  · Find ways to reward yourself that do not involve food.  · Do not drink alcohol if:  ? Your health care provider tells you not to drink.  ? You are pregnant, may be pregnant, or are planning to become pregnant.  · If you drink alcohol:  ? Limit how much you use to:  § 0-1 drink a day for women.  § 0-2 drinks a day for men.  ? Be aware of how much alcohol is in your drink. In the [place] one drink equals one 12 oz bottle of beer (355 mL), one 5 oz glass of wine (148 mL), or one 1½ oz glass of hard liquor (44 mL).  General instructions  · Keep a weight-loss journal to keep track of the food you eat and how much exercise you get.  · Take over-the-counter and prescription medicines only as told by your health care provider.  · Take vitamins and supplements only as told by your health care provider.  · Consider joining a support group. Your health care provider may be able to recommend a support group.  · Keep all follow-up visits as told by your health care provider. This is important.  Contact a health care provider if:  · You are unable to meet your weight loss goal after 6 weeks of dietary and lifestyle changes.  Get help right away if you are having:  · Trouble breathing.  · Suicidal thoughts or behaviors.  Summary  · Obesity is the  condition of having too much total body fat.  · Being overweight or obese means that your weight is greater than what is considered healthy for your body size.  · Work with your health care provider and a dietitian to set a weight-loss goal that is healthy and reasonable for you.  · Exercise regularly, as told by your health care provider. Ask your health care provider what types of exercise are safe for you and how often you should exercise.  This information is not intended to replace advice given to you by your health care provider. Make sure you discuss any questions you have with your health care provider.  Document Revised: 08/22/2019 Document Reviewed: 08/22/2019  Elsevier Patient Education © 2020 [employer]

## 2021-01-04 NOTE — PROGRESS NOTES
You have chosen to receive care through a telephone visit. Do you consent to use a telephone visit for your medical care today? Yes    20 minutes    Patient: Lavelle Cabral  : 1980    Primary Care Provider: Provider, No Known      Chief Complaint: Neck and low back pain    History of Present Illness:       Patient is a 40-year-old gentleman known to the neurosurgical practice for having right-sided meralgia paresthetica as well as some low back pain with neurogenic claudication.  Patient has been laid off since March from [employer] and has noticed that he is having more good days and bad days.    Patient is doing reasonably well on gabapentin we will continue this until we can get him into pain management for an injection.    Unfortunately he is having a myriad of other medical complaints including acquiring Covid twice as well as being recently diagnosed with Grovers disease.  Patient maintains a rash on his torso for close to a month and is tried multiple (22) medications to try to alleviate this but nothing has seemed to help.    In the middle of the year patient was also admitted for hypokalemia and the level is well below 2 per patient's report.    Seeing that all these things are going on I do not think that the back is a urgent matter at the current time.  Patient is under active investigation with other physicians for these other complaints.    I discussed with him his MRI does not show anything that would need urgent surgery but he does have some bilateral foraminal stenosis L4-5 but nothing that would need an operation    Review of Systems  Other than HPI review of systems negative  Past Medical History:     Past Medical History:   Diagnosis Date   • Anxiety    • Depression    • Hypertension    • Neck injury        Family History:     Family History   Problem Relation Age of Onset   • Diabetes Father    • Heart disease Father    • Heart disease Paternal Grandfather        Social  History:    reports that he has never smoked. He has never used smokeless tobacco. He reports current alcohol use of about 3.0 standard drinks of alcohol per week. He reports that he does not use drugs.   SMOKING STATUS: Non-smoker    Surgical History:     Past Surgical History:   Procedure Laterality Date   • APPENDECTOMY N/A 1/10/2017    Procedure: APPENDECTOMY LAPAROSCOPIC;  Surgeon: Nicholas Gifford MD;  [facility];  Service:    • APPENDECTOMY     • HERNIA REPAIR     • KNEE ACL RECONSTRUCTION         Allergies:   Ciprofloxacin, Ultram [tramadol hcl], Amoxicillin, and Mustard [allyl isothiocyanate]    Physical Exam:    Vital Signs:There were no vitals taken for this visit.   BMI: There is no height or weight on file to calculate BMI.     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    However, I will set him up with Dr. Newman for a point of reference to maybe get some injections in the L4-5 area to help with some of his dull legs.  Patient is having more good days and bad days currently after being laid off from [employer].  Patient is better than last time I spoke with him but still having daily back pain and intermittent neurogenic claudication.    We wish him the best and we will see him back on an as-needed basis    Patient's There is no height or weight on file to calculate BMI. BMI is above normal  parameters. Recommendations include: educational material.     Diagnosis Plan   1. Spinal stenosis, lumbar region, with neurogenic claudication  Ambulatory Referral to Pain Management

## 2021-01-28 PROBLEM — M47.816 SPONDYLOSIS OF LUMBAR REGION WITHOUT MYELOPATHY OR RADICULOPATHY: Status: ACTIVE | Noted: 2021-01-28

## 2021-01-28 PROBLEM — F41.9 ANXIETY AND DEPRESSION: Status: ACTIVE | Noted: 2021-01-28

## 2021-01-28 PROBLEM — M51.26 LUMBAR DISCOGENIC PAIN SYNDROME: Status: ACTIVE | Noted: 2021-01-28

## 2021-01-28 PROBLEM — F32.A ANXIETY AND DEPRESSION: Status: ACTIVE | Noted: 2021-01-28

## 2021-01-28 PROBLEM — M51.37 DEGENERATION OF LUMBAR OR LUMBOSACRAL INTERVERTEBRAL DISC: Status: ACTIVE | Noted: 2021-01-28

## 2021-02-03 DIAGNOSIS — M54.16 RADICULOPATHY OF LUMBAR REGION: ICD-10-CM

## 2021-02-03 RX ORDER — GABAPENTIN 600 MG/1
600 TABLET ORAL 3 TIMES DAILY
Qty: 90 TABLET | Refills: 0 | OUTPATIENT
Start: 2021-02-03

## 2021-02-03 NOTE — TELEPHONE ENCOUNTER
Provider:  Finn  Caller: Pt  Phone #:  [phone]  Surgery:  ---  Surgery Date:  ----  Last visit:   1/4/21-Aleksandr  Next visit: ----    AMRITA:         11/05/2020 Gabapentin 600MG 1978 90 30 FINN CARDOSO [facility] 4    11/09/2020 Buprenorphine Hcl/Naloxon 8MG/2MG 1978 12 6 FERNANDO RAMOS [facility] 2    11/13/2020 Buprenorphine Hcl/Naloxon 8MG/2MG 1978 14 7 FERNANDO RAMOS [facility] 2    11/20/2020 Buprenorphine Hcl/Naloxon 8MG/2MG 1978 60 30 KINGMATTY RAMOS [facility] 2    12/18/2020 Gabapentin 600MG 1978 90 30 FINN CARDOSO [facility] 4    12/31/2020 Testosterone Cypionate  200MG/ML/9.45MG/ML/0.2ML/  1978 10 70 [facility] 3    01/04/2021 Buprenorphine Hcl/Naloxon 8MG/2MG 1978 18 9 MICKIE HERNANDZE [facility] 2    01/13/2021 Buprenorphine Hcl/Naloxon 8MG/2MG 1978 14 7 MICKIE HERNANDEZ [facility] 2    01/20/2021 Buprenorphine Hcl/Naloxon 8MG/2MG 1978 14 7 MICKIE HERNANDEZ [facility] 2    01/21/2021 Buprenorphine Hcl/Naloxon 8MG/2MG 1978 28 14 MICKIE HERNANDEZ [facility] 2    Reason for call:           What details did the patient provide when requesting the medication: PT SAID THAT HIS PCP WROTE HIM PREGABALIN AND THAT IT IS NOT HELPING HIM.  HE STATES THAT THEY WILL NOT CONSIDER CHANGING HIS MEDICATION FOR AT LEAST 3 MONTHS.  HE CAN NOT SLEEP. HE IS HAVING PAIN  FROM HIS BACK, DOWN HIS LEFT LEG INTO HIS FOOT.    Requested Prescriptions     Pending Prescriptions Disp Refills   • gabapentin (NEURONTIN) 600 MG tablet 90 tablet 0     Sig: Take 1 tablet by mouth 3 (Three) Times  a Day.

## 2021-02-03 NOTE — TELEPHONE ENCOUNTER
Caller: Lavelle Cabral    Relationship: Self    Best call back number: [phone]    Medication needed:   Requested Prescriptions     Pending Prescriptions Disp Refills   • gabapentin (NEURONTIN) 600 MG tablet 90 tablet 0     Sig: Take 1 tablet by mouth 3 (Three) Times a Day.       When do you need the refill by: asap    What details did the patient provide when requesting the medication: PT SAID THAT HIS PCP WROTE HIM PREGABALIN AND THAT IT IS NOT HELPING HIM.  HE STATES THAT THEY WILL NOT CONSIDER CHANGING HIS MEDICATION FOR AT LEAST 3 MONTHS.  HE CAN NOT SLEEP. HE IS HAVING PAIN  FROM HIS BACK, DOWN HIS LEFT LEG INTO HIS FOOT.    Does the patient have less than a 3 day supply:  [x] Yes  [] No    What is the patient's preferred pharmacy:    [facility] [phone]

## 2021-02-03 NOTE — TELEPHONE ENCOUNTER
As I discussed with him and his office visit.  Patient needs to get these from primary care provider or pain management.  We gave him the refill as a courtesy.  There is nothing we have to offer surgically

## 2021-02-04 NOTE — TELEPHONE ENCOUNTER
Patient needs to discuss with PCP about changing back to gabapentin if the Lyrica is not efficacious.  We are not seeing him back in the future.  I was very direct about our intentions to stop prescribing the gabapentin at last office visit.

## 2021-02-10 ENCOUNTER — PATIENT MESSAGE (OUTPATIENT)
Dept: NEUROSURGERY | Facility: CLINIC | Age: 41
End: 2021-02-10

## 2021-02-10 NOTE — TELEPHONE ENCOUNTER
From: Lavelle Cabral  To: Aleksandr Brian PA-C  Sent: 2/10/2021 9:16 AM EST  Subject: Test Results Question    Have you sent those records to [facility] yet?

## 2021-02-10 NOTE — TELEPHONE ENCOUNTER
PT CALLED AND STATES THAT HE IS TRYING TO GET HIS GABAPENTIN REFILLED BY HIS PCP BY TOMORROW BECAUSE HIS DR IS GOING ON VACATION STARTING TOMORROW. PT HAS REQUESTED MEDICAL RECORDS TO BE SENT TO HIS PCP IN ORDER FOR HIM TO REFILL THE MEDICATION AND THEY HAVE NOT RECEIVED THE RECORDS. PCP WILL NOT REFILL WITHOUT RECORDS. PT STATES IF NOT RESOLVED TODAY THEN HE WILL HAVE TO WAIT 9D UNTIL PCP IS BACK IN OFFICE. PT HAS BEEN OUT OF MEDICATION FOR 2WKS AND SYMPTOMS HAVE RETURNED.    PLEASE FAX RECORDS TO: [phone] PH: [phone].    PT'S BEST CONTACT #: [phone].    PLEASE CONTACT PT REGARDING MATTER, QUESTIONS OR CONCERNS.    THANK YOU!

## 2021-02-11 ENCOUNTER — TELEPHONE (OUTPATIENT)
Dept: NEUROSURGERY | Facility: CLINIC | Age: 41
End: 2021-02-11

## 2021-02-11 NOTE — TELEPHONE ENCOUNTER
Caller: NAVI SUAREZ    Relationship: PT    Best call back number: [phone]    What form or medical record are you requesting: UPDATED COPY OF LETTER FROM DR HOFF/LOVE CANNON SENT TO PT IN 4/29/2020 DESCRIBING CONDITIONS PT HAS BEEN TREATED FOR AND TRANSITION OF MEDICATION TO PCP SO PCP CAN TAKE OVER RX    Who is requesting this form or medical record from you: DR AYO PIERSON    How would you like to receive the form or medical records (pick-up, mail, fax): FAX  If fax, what is the fax number: [phone]    Timeframe paperwork needed: BY 1 PM TODAY BEFORE DR PIERSON LEAVES    Additional notes: PLEASE SEE PT MESSAGES DATED 2/8/21 AND 2/10/21, PT CALLING TO FOLLOW UP ON STATUS OF RECORDS REQUEST.    PLEASE CALL PT WHEN LETTER FAXED SO PT CAN FOLLOW UP.    THANK YOU.

## 2021-02-12 NOTE — TELEPHONE ENCOUNTER
Caller: NAVI SUAREZ     Relationship: PT     Best call back number: [phone]     What form or medical record are you requesting: UPDATED COPY OF LETTER FROM DR HOFF/LOVE CANNON SENT TO PT IN 4/29/2020 DESCRIBING CONDITIONS PT HAS BEEN TREATED FOR AND TRANSITION OF MEDICATION TO PCP SO PCP CAN TAKE OVER RX     Who is requesting this form or medical record from you: DR AYO PIERSON     How would you like to receive the form or medical records (pick-up, mail, fax): FAX  If fax, what is the fax number: [phone] [facility]     Timeframe paperwork needed: ASAP, PT IS OUT OF HIS MEDICATION    Additional notes: PT CALLED BACK, STATES PCP DID NOT RECEIVE FAX AT NUMBER PROVIDED. PLEASE RE-FAX TO MD'S DIRECT NUMBER PROVIDED ABOVE.     THANK YOU.

## 2021-02-19 DIAGNOSIS — R79.89 LOW TESTOSTERONE: ICD-10-CM

## 2021-02-19 NOTE — TELEPHONE ENCOUNTER
PT CALLED NEEDS A PRESCRIPTION FOR SYRINGS CALLED IN TO [facility] IS OUT OF SYRINGS PT BEEN WITHOUT SHOT FOR 2 WEEKS

## 2021-03-23 ENCOUNTER — BULK ORDERING (OUTPATIENT)
Dept: CASE MANAGEMENT | Facility: OTHER | Age: 41
End: 2021-03-23

## 2021-03-23 DIAGNOSIS — Z23 IMMUNIZATION DUE: ICD-10-CM

## 2021-04-10 DIAGNOSIS — R79.89 LOW TESTOSTERONE: ICD-10-CM

## 2021-04-15 RX ORDER — TADALAFIL 5 MG/1
5 TABLET ORAL DAILY PRN
Qty: 30 TABLET | Refills: 6 | Status: SHIPPED | OUTPATIENT
Start: 2021-04-15

## 2021-04-16 NOTE — TELEPHONE ENCOUNTER
Patient said the 5mg cialis does not work for him. He is asking if he can have the 10mg called in. He is asking for a call back to let him know when it has been done.

## 2021-07-08 NOTE — TELEPHONE ENCOUNTER
Error   Zyclara Counseling:  I discussed with the patient the risks of imiquimod including but not limited to erythema, scaling, itching, weeping, crusting, and pain.  Patient understands that the inflammatory response to imiquimod is variable from person to person and was educated regarded proper titration schedule.  If flu-like symptoms develop, patient knows to discontinue the medication and contact us.

## 2021-09-13 ENCOUNTER — OFFICE VISIT (OUTPATIENT)
Dept: UROLOGY | Facility: CLINIC | Age: 41
End: 2021-09-13

## 2021-09-13 VITALS — BODY MASS INDEX: 35.31 KG/M2 | WEIGHT: 225 LBS | HEIGHT: 67 IN

## 2021-09-13 DIAGNOSIS — R79.89 LOW TESTOSTERONE: Primary | ICD-10-CM

## 2021-09-13 PROCEDURE — 99214 OFFICE O/P EST MOD 30 MIN: CPT | Performed by: UROLOGY

## 2021-09-13 RX ORDER — TESTOSTERONE CYPIONATE 200 MG/ML
INJECTION, SOLUTION INTRAMUSCULAR
Qty: 10 ML | Refills: 2 | Status: SHIPPED | OUTPATIENT
Start: 2021-09-13

## 2021-09-13 RX ORDER — TADALAFIL 20 MG/1
20 TABLET ORAL AS NEEDED
Qty: 60 TABLET | Refills: 6 | Status: SHIPPED | OUTPATIENT
Start: 2021-09-13

## 2021-09-13 NOTE — PROGRESS NOTES
"Chief Complaint:          Chief Complaint   Patient presents with   • low testostrone       HPI:   40 y.o. male patient returns today for follow-up.  He is been on testosterone replacement therapy.  He reports a dramatic improvement in his Maksim questionnaire: -MAKSIM-androgen deficiency in the age male questionnaire  The patient was queried regarding the androgen deficiency in the age male questionnaire.  This is a validated questionnaire that was performed on a set of 314 [place] male physicians when it was positive it correlated directly with a 94% chance of low testosterone.  Patient indicates there is a decrease in libido or sex drive, a lack of energy, Decreased  strength and endurance, a decreased \"enjoyment of life\", sad and grumpy feelings with significant difficulty maintaining erections.  He is also been a recent deterioration regarding work performance.  He reports weight loss.  He has good facility and the use of subcutaneous and intramuscular injections as well as comfort level and using the medication in a sterile fashion.  He understands he should use only the prescribed dose.  He's here for appropriate lab monitoring regarding this.  He understands this is a controlled substance and therefore must be watched closely will not be refilled and the medical loss or miss calculation of the dose.  He is very happy with the treatment and therefore wants to continue it.      Past Medical History:        Past Medical History:   Diagnosis Date   • Anxiety    • Depression    • Hypertension    • Neck injury          Current Meds:     Current Outpatient Medications   Medication Sig Dispense Refill   • budesonide-formoterol (SYMBICORT) 160-4.5 MCG/ACT inhaler      • buprenorphine-naloxone (SUBOXONE) 8-2 MG per SL tablet      • clobetasol (TEMOVATE) 0.05 % cream APPLY TO SMALL AREA OF RASH TWICE A DAY FOR 2 WEEKS     • cloNIDine (CATAPRES) 0.1 MG tablet Take 0.1 mg by mouth As Needed for High Blood Pressure.     • " "DULoxetine (CYMBALTA) 30 MG capsule Take 30 mg by mouth 2 (Two) Times a Day.     • gabapentin (NEURONTIN) 600 MG tablet Take 1 tablet by mouth 3 (Three) Times a Day. 90 tablet 0   • gabapentin (NEURONTIN) 600 MG tablet Take 1 tablet by mouth 3 (Three) Times a Day. 90 tablet 0   • hydroCHLOROthiazide (HYDRODIURIL) 25 MG tablet Take 25 mg by mouth 2 (Two) Times a Day.     • methocarbamol (Robaxin) 500 MG tablet Take 1 tablet by mouth 3 (Three) Times a Day As Needed for Muscle Spasms. Please cancel 750mg TID 90 tablet 0   • methocarbamol (Robaxin) 500 MG tablet Take 1 tablet by mouth 3 (Three) Times a Day As Needed for Muscle Spasms. 30 tablet 0   • metoprolol tartrate (LOPRESSOR) 25 MG tablet      • Narcan 4 MG/0.1ML nasal spray      • potassium chloride 10 MEQ CR tablet Take 10 mEq by mouth Daily. with food     • Syringe 25G X 5/8\" 3 ML misc Use as directed 2 x weekly 24 each 3   • Syringe 25G X 5/8\" 3 ML misc Use as directed 2 x weekly 24 each 3   • tadalafil (Cialis) 5 MG tablet Take 1 tablet by mouth Daily As Needed for Erectile Dysfunction. Take one Daily 30 tablet 6   • Testosterone Cypionate (Depo-Testosterone) 200 MG/ML injection Inject 1/2 cc subcutaneously every Monday and Thursday 10 mL 2   • Testosterone Cypionate (Depo-Testosterone) 200 MG/ML injection Inject 1/2 cc subcutaneously every Monday and Thursday 10 mL 2   • Truvada 200-300 MG per tablet Take 1 tablet by mouth Daily.     • valsartan (DIOVAN) 160 MG tablet Take 160 mg by mouth 2 (Two) Times a Day.       No current facility-administered medications for this visit.        Allergies:      Allergies   Allergen Reactions   • Ciprofloxacin Swelling   • Ultram [Tramadol Hcl] Other (See Comments)     Single seizure. Due to taking Tramadol & Cymbalta in combination. -Per pt.    • Amoxicillin Rash   • Mustard [Allyl Isothiocyanate] Rash        Past Surgical History:     Past Surgical History:   Procedure Laterality Date   • APPENDECTOMY N/A 1/10/2017    " Procedure: APPENDECTOMY LAPAROSCOPIC;  Surgeon: Nicholas Gifford MD;  Location: [facility];  Service:    • APPENDECTOMY     • HERNIA REPAIR     • KNEE ACL RECONSTRUCTION           Social History:     Social History     Socioeconomic History   • Marital status: Single     Spouse name: Not on file   • Number of children: Not on file   • Years of education: Not on file   • Highest education level: Not on file   Tobacco Use   • Smoking status: Never Smoker   • Smokeless tobacco: Never Used   Substance and Sexual Activity   • Alcohol use: Yes     Alcohol/week: 3.0 standard drinks     Types: 3 Cans of beer per week   • Drug use: No   • Sexual activity: Yes     Partners: Male       Family History:     Family History   Problem Relation Age of Onset   • Diabetes Father    • Heart disease Father    • Heart disease Paternal Grandfather        Review of Systems:     Review of Systems   Constitutional: Negative.    HENT: Negative.    Eyes: Negative.    Respiratory: Negative.    Cardiovascular: Negative.    Gastrointestinal: Negative.    Endocrine: Negative.    Musculoskeletal: Negative.    Allergic/Immunologic: Negative.    Neurological: Negative.    Hematological: Negative.    Psychiatric/Behavioral: Negative.        Physical Exam:     Physical Exam  Vitals and nursing note reviewed.   Constitutional:       Appearance: He is well-developed.   HENT:      Head: Normocephalic and atraumatic.   Eyes:      Conjunctiva/sclera: Conjunctivae normal.      Pupils: Pupils are equal, round, and reactive to light.   Cardiovascular:      Rate and Rhythm: Normal rate and regular rhythm.      Heart sounds: Normal heart sounds.   Pulmonary:      Effort: Pulmonary effort is normal.      Breath sounds: Normal breath sounds.   Abdominal:      General: Bowel sounds are normal.      Palpations: Abdomen is soft.   Musculoskeletal:         General: Normal range of motion.      Cervical back: Normal range of motion.   Skin:     General: Skin is warm  and dry.   Neurological:      Mental Status: He is alert and oriented to person, place, and time.      Deep Tendon Reflexes: Reflexes are normal and symmetric.   Psychiatric:         Behavior: Behavior normal.         Thought Content: Thought content normal.         Judgment: Judgment normal.         I have reviewed the following portions of the patient's history: allergies, current medications, past family history, past medical history, past social history, past surgical history, problem list and ROS and confirm it's accurate.      Procedure:       Assessment/Plan:   Low testosterone:  patient is here for follow-up.  Since beginning the medication he's been very pleased.  He reports a dramatic improvement in his erections, ability to achieve and maintain an erection, improvement in libido, increase in frequency of morning erections, a noticeable weight loss consistent with the treatment.  No development of breast problems or abnormalities.  He's going to have appropriate safety laboratory parameters checked.   He understands that the new data implicates testosterone with the development of prostate cancer and this is all but been disproven and the medical literature as well as the risks of cardiovascular disease which is actually also been disproven.  He understands that while he is a candidate for topical therapy if he is in contact with children this is not an option because it's been shown to accentuate genitalia development at an early age that this frequently irreversible.  He also understands this is a controlled substance and as such will not be prescribed without appropriate follow-up and appropriate laboratory investigation.  He understands effects on spermatogenesis including the fact that this is not always completely reversible and not always completely limited his ability to father a child.  He has demonstrated facility in the technique of both intramuscular and subcutaneous injection.  And has been  taught sterility one drawing up the medication.  Erectile dysfunction-we discussed the anatomy and physiology of the penis and the endothelium.  We discussed the various forms of erectile dysfunction including peripheral vascular occlusive disease, postoperative, secondary to radiation treatments of the prostate, and arterial inflow.  We discussed the various treatment options available including oral medication and its various forms.  We discussed the use of both generic and non-generic Viagra.  We discussed Cialis and a longer half-life of 17 hours as well as the other 2 medications.  We discussed cost involved with this including the fact that the generic is much cheaper but is taken has multiple pills because they are 20 mg dosages.  We did discuss the other alternatives including Penile injections, vacuum erection devices and surgical intervention reserved for only the most severe cases.  We discussed the need for testosterone in about 20% of cases of erectile dysfunction.  Continue Cialis  Hyperestrogenism-we spoke about the role of estrogen metabolism and breakdown in the  presence of testosterone replacement therapy.  We spoke about how high estradiol levels can interfere with the improvement noted in a man on testosterone as well as significant side effects such as pseudogynecomastia.  We discussed the use of the medication arimidex using a very judicious low-dose fashion to prevent too low of an estradiol which would precipitate bone complications.  Going to check an estradiol level  Polycythemia-I am going to check a CBC to rule out hemoglobin changes.  We utilized the American Heart Association guidelines for polycythemia which is a hemoglobin greater than 18 and a hematocrit greater than 54.5.  Recommend therapeutic phlebotomy as the treatment.  It is important that we indicate that is the most likely cause of the polycythemia.  We also discussed the possibility of decreasing the dose of  testosterone.                    This document has been electronically signed by ZULEAM SALAMANCA MD September 13, 2021 16:20 EDT

## 2022-02-25 ENCOUNTER — PRIOR AUTHORIZATION (OUTPATIENT)
Dept: UROLOGY | Facility: CLINIC | Age: 42
End: 2022-02-25

## (undated) DEVICE — DUAL LUMEN STOMACH TUBE,ANTI-REFLUX VALVE: Brand: SALEM SUMP

## (undated) DEVICE — SOL LR 1000ML

## (undated) DEVICE — COVER,LIGHT HANDLE,FLX,1/PK: Brand: MEDLINE INDUSTRIES, INC.

## (undated) DEVICE — ANTIBACTERIAL UNDYED BRAIDED (POLYGLACTIN 910), SYNTHETIC ABSORBABLE SUTURE: Brand: COATED VICRYL

## (undated) DEVICE — ENDOPOUCH RETRIEVER SPECIMEN RETRIEVAL BAGS: Brand: ENDOPOUCH RETRIEVER

## (undated) DEVICE — [HIGH FLOW HEATED INSUFFLATOR TUBING,  DO NOT USE IF PACKAGE IS DAMAGED]

## (undated) DEVICE — ENDOPATH ETS-FLEX45 ARTICULATING ENDOSCOPIC LINEAR CUTTER, NO RELOAD: Brand: ENDOPATH

## (undated) DEVICE — ENDOPATH XCEL BLADELESS TROCARS WITH STABILITY SLEEVES: Brand: ENDOPATH XCEL

## (undated) DEVICE — GOWN,PREVENTION PLUS,XXLARGE,STERILE: Brand: MEDLINE

## (undated) DEVICE — ENDOPATH XCEL BLUNT TIP TROCARS WITH SMOOTH SLEEVES: Brand: ENDOPATH XCEL

## (undated) DEVICE — SUT VIC 0 UR6 27IN VCP603H

## (undated) DEVICE — GLV SURG SENSICARE W/ALOE PF LF 7 STRL

## (undated) DEVICE — GLV SURG SENSICARE W/ALOE PF LF 7.5 STRL

## (undated) DEVICE — CANNULA,ADULT,SOFT-TOUCH,7TUBE,SC: Brand: MEDLINE

## (undated) DEVICE — ENDOPATH XCEL UNIVERSAL TROCAR STABLILITY SLEEVES: Brand: ENDOPATH XCEL

## (undated) DEVICE — AIRWY SZ11

## (undated) DEVICE — Device

## (undated) DEVICE — ENDOCUT SCISSOR TIP, DISPOSABLE: Brand: RENEW

## (undated) DEVICE — PK LAP LASR CHOLE 10